# Patient Record
Sex: FEMALE | Race: WHITE | Employment: FULL TIME
[De-identification: names, ages, dates, MRNs, and addresses within clinical notes are randomized per-mention and may not be internally consistent; named-entity substitution may affect disease eponyms.]

---

## 2015-04-01 LAB
HPV ABSTRACT: NORMAL
PAP-ABSTRACT: NORMAL

## 2016-07-14 LAB
HPV ABSTRACT: ABNORMAL
PAP-ABSTRACT: NORMAL

## 2017-03-10 ENCOUNTER — SURGERY (OUTPATIENT)
Age: 62
End: 2017-03-10

## 2017-03-10 ENCOUNTER — HOSPITAL ENCOUNTER (OUTPATIENT)
Facility: CLINIC | Age: 62
Discharge: HOME | End: 2017-03-10
Attending: COLON & RECTAL SURGERY | Admitting: COLON & RECTAL SURGERY
Payer: COMMERCIAL

## 2017-03-10 VITALS
BODY MASS INDEX: 21.35 KG/M2 | OXYGEN SATURATION: 100 % | WEIGHT: 116 LBS | DIASTOLIC BLOOD PRESSURE: 68 MMHG | HEIGHT: 62 IN | SYSTOLIC BLOOD PRESSURE: 99 MMHG | RESPIRATION RATE: 11 BRPM

## 2017-03-10 LAB — COLONOSCOPY: NORMAL

## 2017-03-10 PROCEDURE — 25000128 H RX IP 250 OP 636: Performed by: COLON & RECTAL SURGERY

## 2017-03-10 PROCEDURE — G0500 MOD SEDAT ENDO SERVICE >5YRS: HCPCS | Performed by: COLON & RECTAL SURGERY

## 2017-03-10 PROCEDURE — 25000125 ZZHC RX 250: Performed by: COLON & RECTAL SURGERY

## 2017-03-10 PROCEDURE — 99153 MOD SED SAME PHYS/QHP EA: CPT

## 2017-03-10 PROCEDURE — G0121 COLON CA SCRN NOT HI RSK IND: HCPCS | Performed by: COLON & RECTAL SURGERY

## 2017-03-10 PROCEDURE — 45378 DIAGNOSTIC COLONOSCOPY: CPT | Performed by: COLON & RECTAL SURGERY

## 2017-03-10 RX ORDER — ONDANSETRON 2 MG/ML
4 INJECTION INTRAMUSCULAR; INTRAVENOUS
Status: DISCONTINUED | OUTPATIENT
Start: 2017-03-10 | End: 2017-03-10 | Stop reason: HOSPADM

## 2017-03-10 RX ORDER — FLUMAZENIL 0.1 MG/ML
0.2 INJECTION, SOLUTION INTRAVENOUS
Status: DISCONTINUED | OUTPATIENT
Start: 2017-03-10 | End: 2017-03-10 | Stop reason: HOSPADM

## 2017-03-10 RX ORDER — ONDANSETRON 2 MG/ML
4 INJECTION INTRAMUSCULAR; INTRAVENOUS EVERY 6 HOURS PRN
Status: DISCONTINUED | OUTPATIENT
Start: 2017-03-10 | End: 2017-03-10 | Stop reason: HOSPADM

## 2017-03-10 RX ORDER — NALOXONE HYDROCHLORIDE 0.4 MG/ML
.1-.4 INJECTION, SOLUTION INTRAMUSCULAR; INTRAVENOUS; SUBCUTANEOUS
Status: DISCONTINUED | OUTPATIENT
Start: 2017-03-10 | End: 2017-03-10 | Stop reason: HOSPADM

## 2017-03-10 RX ORDER — LIDOCAINE 40 MG/G
CREAM TOPICAL
Status: DISCONTINUED | OUTPATIENT
Start: 2017-03-10 | End: 2017-03-10 | Stop reason: HOSPADM

## 2017-03-10 RX ORDER — ONDANSETRON 4 MG/1
4 TABLET, ORALLY DISINTEGRATING ORAL EVERY 6 HOURS PRN
Status: DISCONTINUED | OUTPATIENT
Start: 2017-03-10 | End: 2017-03-10 | Stop reason: HOSPADM

## 2017-03-10 RX ORDER — FENTANYL CITRATE 50 UG/ML
INJECTION, SOLUTION INTRAMUSCULAR; INTRAVENOUS PRN
Status: DISCONTINUED | OUTPATIENT
Start: 2017-03-10 | End: 2017-03-10 | Stop reason: HOSPADM

## 2017-03-10 RX ORDER — SODIUM CHLORIDE 9 MG/ML
INJECTION, SOLUTION INTRAVENOUS CONTINUOUS PRN
Status: DISCONTINUED | OUTPATIENT
Start: 2017-03-10 | End: 2017-03-10 | Stop reason: HOSPADM

## 2017-03-10 RX ADMIN — MIDAZOLAM HYDROCHLORIDE 2 MG: 1 INJECTION, SOLUTION INTRAMUSCULAR; INTRAVENOUS at 10:47

## 2017-03-10 RX ADMIN — SODIUM CHLORIDE 500 ML: 9 INJECTION, SOLUTION INTRAVENOUS at 10:54

## 2017-03-10 RX ADMIN — FENTANYL CITRATE 100 MCG: 50 INJECTION, SOLUTION INTRAMUSCULAR; INTRAVENOUS at 10:46

## 2017-03-10 NOTE — H&P
"Pre-Endoscopy History and Physical     Farnaz JAIME [facility] MRN# [id number]   YOB: 1955 Age: 61 year old     Date of Procedure: 3/10/2017  Primary care provider: Riley Martin  Type of Endoscopy: Colonoscopy  Reason for Procedure: hx of polyps  Type of Anesthesia Anticipated: Moderate Sedation    HPI:    Farnaz is a 61 year old female who will be undergoing the above procedure.      A history and physical has been performed. The patient's medications and allergies have been reviewed. The risks and benefits of the procedure and the sedation options and risks were discussed with the patient.  All questions were answered and informed consent was obtained.      She denies a personal or family history of anesthesia complications or bleeding disorders.     No Known Allergies       No current facility-administered medications on file prior to encounter.   Current Outpatient Prescriptions on File Prior to Encounter:  Cholecalciferol (VITAMIN D-3 PO) Take  by mouth.   calcium citrate (CALCITRATE) 950 MG tablet Take 1 tablet by mouth daily.       There is no problem list on file for this patient.       Past Medical History   Diagnosis Date     Colon polyps         Past Surgical History   Procedure Laterality Date      section       x 2     Colonoscopy  3/10/17, 2012     hx polyps       Social History   Substance Use Topics     Smoking status: Never Smoker     Smokeless tobacco: Not on file     Alcohol use Yes      Comment: 1/day       Family History   Problem Relation Age of Onset     Breast Cancer Maternal Grandmother      GASTROINTESTINAL DISEASE Mother      colitis     GASTROINTESTINAL DISEASE Daughter      crohns       REVIEW OF SYSTEMS:     5 point ROS negative except as noted above in HPI, including Gen., Resp., CV, GI &  system review.      PHYSICAL EXAM:   /78  Resp 16  Ht 1.575 m (5' 2\")  Wt 52.6 kg (116 lb)  SpO2 100%  BMI 21.22 kg/m2 Estimated body mass index is 21.22 kg/(m^2) as " "calculated from the following:    Height as of this encounter: 1.575 m (5' 2\").    Weight as of this encounter: 52.6 kg (116 lb).   GENERAL APPEARANCE: healthy and alert  MENTAL STATUS: alert  AIRWAY EXAM: Mallampatti Class I (visualization of the soft palate, fauces, uvula, anterior and posterior pillars)  RESP: lungs clear to auscultation - no rales, rhonchi or wheezes  CV: regular rates and rhythm      IMPRESSION   ASA Class 2 - Mild systemic disease        PLAN:     Plan for colonoscopy. We discussed the risks, benefits and alternatives and the patient wished to proceed.    The above has been forwarded to the consulting provider.      Selma Duval MD  [facility]  Phone: [phone]  Fax: [phone]  March 10, 2017    "

## 2017-07-19 ENCOUNTER — TRANSFERRED RECORDS (OUTPATIENT)
Dept: HEALTH INFORMATION MANAGEMENT | Facility: CLINIC | Age: 62
End: 2017-07-19

## 2017-07-19 LAB
HPV ABSTRACT: ABNORMAL
PAP-ABSTRACT: NORMAL

## 2017-08-10 ENCOUNTER — RESULT FOLLOW UP (OUTPATIENT)
Dept: OBGYN | Facility: CLINIC | Age: 62
End: 2017-08-10

## 2017-08-10 ENCOUNTER — OFFICE VISIT (OUTPATIENT)
Dept: OBGYN | Facility: CLINIC | Age: 62
End: 2017-08-10
Payer: COMMERCIAL

## 2017-08-10 VITALS
BODY MASS INDEX: 22.08 KG/M2 | DIASTOLIC BLOOD PRESSURE: 66 MMHG | HEIGHT: 62 IN | SYSTOLIC BLOOD PRESSURE: 112 MMHG | WEIGHT: 120 LBS

## 2017-08-10 DIAGNOSIS — R87.810 CERVICAL HIGH RISK HPV (HUMAN PAPILLOMAVIRUS) TEST POSITIVE: ICD-10-CM

## 2017-08-10 DIAGNOSIS — R87.612 PAPANICOLAOU SMEAR OF CERVIX WITH LOW GRADE SQUAMOUS INTRAEPITHELIAL LESION (LGSIL): Primary | ICD-10-CM

## 2017-08-10 PROCEDURE — 87624 HPV HI-RISK TYP POOLED RSLT: CPT | Performed by: OBSTETRICS & GYNECOLOGY

## 2017-08-10 PROCEDURE — 57455 BIOPSY OF CERVIX W/SCOPE: CPT | Performed by: OBSTETRICS & GYNECOLOGY

## 2017-08-10 PROCEDURE — 88305 TISSUE EXAM BY PATHOLOGIST: CPT | Performed by: OBSTETRICS & GYNECOLOGY

## 2017-08-10 PROCEDURE — 88142 CYTOPATH C/V THIN LAYER: CPT | Performed by: OBSTETRICS & GYNECOLOGY

## 2017-08-10 RX ORDER — ZOLPIDEM TARTRATE 10 MG/1
2.5 TABLET ORAL
COMMUNITY
Start: 2017-07-28

## 2017-08-10 ASSESSMENT — ANXIETY QUESTIONNAIRES
2. NOT BEING ABLE TO STOP OR CONTROL WORRYING: NOT AT ALL
IF YOU CHECKED OFF ANY PROBLEMS ON THIS QUESTIONNAIRE, HOW DIFFICULT HAVE THESE PROBLEMS MADE IT FOR YOU TO DO YOUR WORK, TAKE CARE OF THINGS AT HOME, OR GET ALONG WITH OTHER PEOPLE: NOT DIFFICULT AT ALL
7. FEELING AFRAID AS IF SOMETHING AWFUL MIGHT HAPPEN: NOT AT ALL
5. BEING SO RESTLESS THAT IT IS HARD TO SIT STILL: NOT AT ALL
3. WORRYING TOO MUCH ABOUT DIFFERENT THINGS: NOT AT ALL
6. BECOMING EASILY ANNOYED OR IRRITABLE: NOT AT ALL
GAD7 TOTAL SCORE: 0
1. FEELING NERVOUS, ANXIOUS, OR ON EDGE: NOT AT ALL

## 2017-08-10 ASSESSMENT — PATIENT HEALTH QUESTIONNAIRE - PHQ9
SUM OF ALL RESPONSES TO PHQ QUESTIONS 1-9: 0
5. POOR APPETITE OR OVEREATING: NOT AT ALL

## 2017-08-10 NOTE — MR AVS SNAPSHOT
"              After Visit Summary   8/10/2017    Farnaz Will    MRN: [id number]           Patient Information     Date Of Birth          1955        Visit Information        Provider Department      8/10/2017 1:30 PM Dennis Quiroz MD; WE COLPOSCOPE [facility] [place]        Today's Diagnoses     Papanicolaou smear of cervix with low grade squamous intraepithelial lesion (LGSIL)    -  1       Follow-ups after your visit        Who to contact     If you have questions or need follow up information about today's clinic visit or your schedule please contact [facility] directly at [phone].  Normal or non-critical lab and imaging results will be communicated to you by MyChart, letter or phone within 4 business days after the clinic has received the results. If you do not hear from us within 7 days, please contact the clinic through MyChart or phone. If you have a critical or abnormal lab result, we will notify you by phone as soon as possible.  Submit refill requests through Parkt or call your pharmacy and they will forward the refill request to us. Please allow 3 business days for your refill to be completed.          Additional Information About Your Visit        MyChart Information     Parkt lets you send messages to your doctor, view your test results, renew your prescriptions, schedule appointments and more. To sign up, go to [email or web address] . Click on \"Log in\" on the left side of the screen, which will take you to the Welcome page. Then click on \"Sign up Now\" on the right side of the page.     You will be asked to enter the access code listed below, as well as some personal information. Please follow the directions to create your username and password.     Your access code is: [email or web address]  Expires: 2017 11:57 AM     Your access code will  in 90 days. If you need help or a new code, please call your [place] clinic or [phone].      " "  Care EveryWhere ID     This is your Care EveryWhere ID. This could be used by other organizations to access your [facility] medical records  [id number]        Your Vitals Were     Height Breastfeeding? BMI (Body Mass Index)             1.575 m (5' 2\") No 21.95 kg/m2          Blood Pressure from Last 3 Encounters:   08/10/17 112/66   03/10/17 99/68   01/30/12 94/62    Weight from Last 3 Encounters:   08/10/17 54.4 kg (120 lb)   03/10/17 52.6 kg (116 lb)   01/30/12 51.7 kg (114 lb)              We Performed the Following     A pap thin layer diagnostic     COLPOSCOPY CERVIX/UPPER VAGINA W BX CERVIX     HPV High Risk Types DNA Cervical     Pap imaged thin layer diagnostic with HPV (select HPV order below)     Surgical pathology exam        Primary Care Provider Office Phone # Fax #    Riley Elle Martin [phone]       [facility] [address]  [facility] [address]        Equal Access to Services     JULIA REED : Hadii aad ku hadasho Soomaali, waaxda luqadaha, qaybta kaalmada adeegyada, waxay giovanniin hayadriane herrera . So [facility] [phone].    ATENCIÓN: Si habla español, tiene a burton disposición servicios gratuitos de asistencia lingüística. [facility] [phone].    We comply with applicable federal civil rights laws and [place] laws. We do not discriminate on the basis of race, color, national origin, age, disability sex, sexual orientation or gender identity.            Thank you!     Thank you for choosing [facility]  for your care. Our goal is always to provide you with excellent care. Hearing back from our patients is one way we can continue to improve our services. Please take a few minutes to complete the written survey that you may receive in the mail after your visit with us. Thank you!             Your Updated Medication List - Protect others around you: Learn how to safely use, store and throw away your medicines at www.disposemymeds.org.          This " list is accurate as of: 8/10/17 11:59 PM.  Always use your most recent med list.                   Brand Name Dispense Instructions for use Diagnosis    calcium citrate 950 MG tablet    CALCITRATE     Take 1 tablet by mouth daily.        VITAMIN D-3 PO      Take  by mouth.        zolpidem 10 MG tablet    AMBIEN     Take 10 mg by mouth

## 2017-08-10 NOTE — PROGRESS NOTES
INDICATIONS:                                                    Is a pregnancy test required: No.  Was a consent obtained?  Yes    Today's PHQ-2 Score: No flowsheet data found.  Today's PHQ-9 Score:    PHQ-9 SCORE 8/10/2017   Total Score 0     Today's GAD7 Score: 0    Farnaz Will, is a 62 year old female, who had a recent  Pap positive for high risk hpv.done:[id number]}.  {YES / . Here today for colposcopy. Discussed indication, risks of infection and bleeding.    Her last pap was No results found for: PAP .    PROCEDURE:                                                      Cervix is stained with acetic acid and viewed colposcopically. Squamocolumnar junction is not visualized in it's entirity. no visible lesions . Biopsy done No. Endocervical curretage Done         POST PROCEDURE:                                                      IMPRESSION await path    PLAN : Await the results of the biopsies. She  tolerated the procedure well. There were no complications. Patient was discharged in stable condition.    Patient advised to call the clinic if excessive bleeding, pelvic pain, or fever.     Follow-up plan based on pathology results.    Dennis Quiroz MD

## 2017-08-10 NOTE — LETTER
August 27, 2019      Farnaz Richard Will  [address]  [place] [address]    Dear ,      At [facility], your health and wellness is our primary concern. That is why we are following up on a colposcopy from 09/10/18. Your provider had recommended that you have a Pap smear and HPV test completed by 09/10/19. Our records do not show that this has been scheduled.    It is important to complete the follow up that your provider has suggested for you to ensure that there are no worsening changes which may, over time, develop into cancer.      Please contact our office at  [phone] to schedule an appointment for a Pap smear and HPV test at your earliest convenience. If you have questions or concerns, please call the clinic and we will be happy to assist you.    If you have completed the tests outside of [facility], please have the results forwarded to our office. We will update the chart for your primary Physician to review before your next annual physical.     Thank you for choosing [facility]!    Sincerely,      Your [facility] Care Team/[facility]

## 2017-08-10 NOTE — LETTER
July 27, 2018      Farnaz Ramos Nicolette  [address] [place] [address]    Dear MsMirtaNicolette,      At [facility], your health and wellness is our primary concern. That is why we are following up on an endocervical curettage, laser vaporization conization of the cervix from 11/09/17. Your provider had recommended that you have a Pap smear and HPV test completed by 08/10/18. Our records do not show that this has been scheduled.    It is important to complete the follow up that your provider has suggested for you to ensure that there are no worsening changes which may, over time, develop into cancer.      Please contact our office at  [phone] to schedule an appointment for a Pap smear and HPV test at your earliest convenience. If you have questions or concerns, please call the clinic and we will be happy to assist you.    If you have completed the tests outside of [facility], please have the results forwarded to our office. We will update the chart for your primary Physician to review before your next annual physical.     Thank you for choosing [facility]!    Sincerely,      Dennis Quiroz MD/[facility]

## 2017-08-11 ASSESSMENT — ANXIETY QUESTIONNAIRES: GAD7 TOTAL SCORE: 0

## 2017-08-14 LAB
COPATH REPORT: NORMAL
COPATH REPORT: NORMAL
PAP: NORMAL

## 2017-08-16 LAB
FINAL DIAGNOSIS: NORMAL
HPV HR 12 DNA CVX QL NAA+PROBE: NEGATIVE
HPV16 DNA SPEC QL NAA+PROBE: NEGATIVE
HPV18 DNA SPEC QL NAA+PROBE: NEGATIVE
SPECIMEN DESCRIPTION: NORMAL

## 2017-08-22 NOTE — PROGRESS NOTES
7/14/16 NIL/+ HR HPV (not 16/18). Plan: cotest in 1 year  7/19/17 unsat pap/+ HR HPV. Plan: colposocpy  8/10/17 Colpo.  No bx.  ECC-nongradable cervical dysplasia. DX NIL/NEg HPV Plan: make appt to discuss with provider  8/30/17   Appt. Plan: cone bx  11/9/17 Conization--negative. ([facility])  11/28/17 Post op visit. Plan: RTC in 2 months. ([facility])  01/23/18 The patient has an excellent outcome from her cervical conization.  She will return for a repeat Pap smear this summer. ([facility])  07/27/18 Pap reminder letter sent. ([facility])  8/16/18 NIL pap, + HR HPV (not 16/18). Plan colp due by 11/16/18 ([facility])  8/23/18  for pt to return call/advised of result and follow up (S)  9/10/18 Colpo-visually normal. Dx NIL pap, Neg HPV.  Plan: cotest in 1 year  08/27/19 Cotest reminder letter sent. ([facility])  09/25/19 TC clinic and schedule. ([facility])  10/10/19: NIL Pap, + HR HPV types 18 and other. Plan [place], due bef 01/10/20. (sk)  10/23/19 left msg (lks) detailed msg (lks)  11/18/19 Colpo: Visually normal. NIL pap, + HR HPV 18. Plan: cotest in 1 year (at annual 2020) ([facility])

## 2017-08-30 ENCOUNTER — OFFICE VISIT (OUTPATIENT)
Dept: OBGYN | Facility: CLINIC | Age: 62
End: 2017-08-30
Payer: COMMERCIAL

## 2017-08-30 VITALS
BODY MASS INDEX: 22.08 KG/M2 | WEIGHT: 120 LBS | DIASTOLIC BLOOD PRESSURE: 64 MMHG | HEART RATE: 68 BPM | SYSTOLIC BLOOD PRESSURE: 102 MMHG | HEIGHT: 62 IN

## 2017-08-30 DIAGNOSIS — D06.9 SEVERE CERVICAL DYSPLASIA: Primary | ICD-10-CM

## 2017-08-30 PROCEDURE — 99214 OFFICE O/P EST MOD 30 MIN: CPT | Performed by: OBSTETRICS & GYNECOLOGY

## 2017-08-30 NOTE — PROGRESS NOTES
SUBJECTIVE:                                                   Farnaz Will is a 62 year old female who presents to clinic today for the following health issue(s):  Patient presents with:  Results: here to discuss colpo results      HPI: The patient was seen at this time and follow-up of an abnormal Pap smear and colposcopy with a negative ectocervix and ungradeable dysplasia of the endocervix. We went through a long discussion of HPV and cervical dysplasia.      No LMP recorded. Patient is postmenopausal..   Patient is sexually active, No obstetric history on file..  Using menopause for contraception.    reports that she has never smoked. She has never used smokeless tobacco.      STD testing offered?  Declined    Health maintenance updated:  yes    Today's PHQ-2 Score: No flowsheet data found.  Today's PHQ-9 Score:   PHQ-9 SCORE 8/10/2017   Total Score 0     Today's RICARDO-7 Score:   RICARDO-7 SCORE 8/10/2017   Total Score 0       Problem list and histories reviewed & adjusted, as indicated.  Additional history: as documented.    Patient Active Problem List   Diagnosis     Cervical high risk HPV (human papillomavirus) test positive     Past Surgical History:   Procedure Laterality Date      SECTION      x 2     COLONOSCOPY  3/10/17,     hx polyps     COLONOSCOPY N/A 3/10/2017    Procedure: COLONOSCOPY;  Surgeon: Selma Duval MD;  Location:  GI      Social History   Substance Use Topics     Smoking status: Never Smoker     Smokeless tobacco: Never Used     Alcohol use Yes      Comment: 1/day      Problem (# of Occurrences) Relation (Name,Age of Onset)    Breast Cancer (1) Maternal Grandmother    GASTROINTESTINAL DISEASE (2) Mother: colitis, Daughter: crohns            Current Outpatient Prescriptions   Medication Sig     zolpidem (AMBIEN) 10 MG tablet Take 10 mg by mouth     Cholecalciferol (VITAMIN D-3 PO) Take  by mouth.     calcium citrate (CALCITRATE) 950 MG tablet Take 1 tablet by mouth  "daily.     No current facility-administered medications for this visit.      No Known Allergies    ROS:  12 point review of systems negative other than symptoms noted below.    OBJECTIVE:     /64  Pulse 68  Ht 5' 2\" (1.575 m)  Wt 120 lb (54.4 kg)  BMI 21.95 kg/m2  Body mass index is 21.95 kg/(m^2).    Exam:  Constitutional:  Appearance: Well nourished, well developed alert, in no acute distress  Neck:  Lymph Nodes:  No lymphadenopathy present; Thyroid:  Gland size normal, nontender, no nodules or masses present on palpation  Chest:  Respiratory Effort:  Breathing unlabored  Cardiovascular: Heart: Auscultation:  Regular rate, normal rhythm, no murmurs present  Gastrointestinal:  Abdominal Examination:  Abdomen nontender to palpation, tone normal without rigidity or guarding, no masses present, umbilicus without lesions; Liver/Spleen:  No hepatomegaly present, liver nontender to palpation; Hernias:  No hernias present  Lymphatic: Lymph Nodes:  No other lymphadenopathy present  Skin:General Inspection:  No rashes present, no lesions present, no areas of discoloration; Genitalia and Groin:  No rashes present, no lesions present, no areas of discoloration, no masses present.  Neurologic/Psychiatric:  Mental Status:  Oriented X3   No Pelvic Exam performed     In-Clinic Test Results:    ASSESSMENT/PLAN:                                                      The patient is cleared for general anesthesia. We anticipate a cone biopsy with endocervical curettage due to an agreeable dysplasia. The risks and complications have been reviewed and accepted.          Dennis Quiroz MD  [facility]  "

## 2017-08-30 NOTE — MR AVS SNAPSHOT
"              After Visit Summary   2017    Farnaz Will    MRN: [id number]           Patient Information     Date Of Birth          1955        Visit Information        Provider Department      2017 11:45 AM Dennis Quiroz MD [facility]        Today's Diagnoses     Severe cervical dysplasia    -  1       Follow-ups after your visit        Who to contact     If you have questions or need follow up information about today's clinic visit or your schedule please contact [facility] directly at [phone].  Normal or non-critical lab and imaging results will be communicated to you by Student Designedhart, letter or phone within 4 business days after the clinic has received the results. If you do not hear from us within 7 days, please contact the clinic through Student Designedhart or phone. If you have a critical or abnormal lab result, we will notify you by phone as soon as possible.  Submit refill requests through Billtrust or call your pharmacy and they will forward the refill request to us. Please allow 3 business days for your refill to be completed.          Additional Information About Your Visit        MyChart Information     Billtrust lets you send messages to your doctor, view your test results, renew your prescriptions, schedule appointments and more. To sign up, go to [email or web address] . Click on \"Log in\" on the left side of the screen, which will take you to the Welcome page. Then click on \"Sign up Now\" on the right side of the page.     You will be asked to enter the access code listed below, as well as some personal information. Please follow the directions to create your username and password.     Your access code is: [email or web address]  Expires: 2017 11:57 AM     Your access code will  in 90 days. If you need help or a new code, please call your [place] clinic or [phone].        Care EveryWhere ID     This is your Care EveryWhere ID. This could be used " "by other organizations to access your [facility] medical records  [id number]        Your Vitals Were     Pulse Height BMI (Body Mass Index)             68 1.575 m (5' 2\") 21.95 kg/m2          Blood Pressure from Last 3 Encounters:   08/30/17 102/64   08/10/17 112/66   03/10/17 99/68    Weight from Last 3 Encounters:   08/30/17 54.4 kg (120 lb)   08/10/17 54.4 kg (120 lb)   03/10/17 52.6 kg (116 lb)              Today, you had the following     No orders found for display       Primary Care Provider Office Phone # Fax #    Riley Elel Martin [phone]       [employer] [facility] [address]  [facility] [address]        Equal Access to Services     ZOE REED : Karyna Milton, waswathi luqadaha, qaybkeyla kaalmaaaron nunn, kylie herrera . So [facility] [phone].    ATENCIÓN: Si habla español, tiene a burton disposición servicios gratuitos de asistencia lingüística. Llame al [phone].    We comply with applicable federal civil rights laws and [place] laws. We do not discriminate on the basis of race, color, national origin, age, disability sex, sexual orientation or gender identity.            Thank you!     Thank you for choosing [facility]  for your care. Our goal is always to provide you with excellent care. Hearing back from our patients is one way we can continue to improve our services. Please take a few minutes to complete the written survey that you may receive in the mail after your visit with us. Thank you!             Your Updated Medication List - Protect others around you: Learn how to safely use, store and throw away your medicines at www.disposemymeds.org.          This list is accurate as of: 8/30/17 12:24 PM.  Always use your most recent med list.                   Brand Name Dispense Instructions for use Diagnosis    calcium citrate 950 MG tablet    CALCITRATE     Take 1 tablet by mouth daily.        VITAMIN D-3 PO      Take  by " mouth.        zolpidem 10 MG tablet    AMBIEN     Take 10 mg by mouth

## 2017-08-31 ENCOUNTER — TELEPHONE (OUTPATIENT)
Dept: OBGYN | Facility: CLINIC | Age: 62
End: 2017-08-31

## 2017-08-31 NOTE — LETTER
November 6, 2017    Farnaz JAIME [facility]                                                                                                                     [address] [place] [address]    Dear Farnaz,    We have made effort to obtain an accurate quote from your insurance company based on the information we have on file. Your actual coverage may differ. [facility] can NOT guarantee coverage. We recommend that if you have questions regarding coverage to call your insurance company. Our billing department is happy to assist you with your insurance claim but you are responsible for all services rendered whether or not they are paid by your insurance provider. Again, this is not a guarantee of benefits just a notice of the information they have given to us.       Insurance Co [employer] Phone # [phone]  ID [id number]         Group [id number]  Mckayla lopes w/ Zee on 11/6/2017    Policy Eff Date 1/1/2017 and current Yes  CoInsurance (covered at) 75% after deductible has been met and 100% after MOOP is met  Deductible $2250 met to date $1435.21  Max Out Of Pocket (MOOP) $3000 met to date $1439.93  CoPay $NA  Prior Auth Required:  No  Pre Existing Condition No  Surgeon TEN Quiroz In Network Yes  [facility] In Network Yes  Referral Needed:  No.  Mailed to Patient Yes                  If No Document why by date                  If Yes                                   Date 11/6/2017      Sincerely,         Mckayla Oneill  Surgery Scheduler

## 2017-08-31 NOTE — TELEPHONE ENCOUNTER
FROM VERBAL AS NO SX REQUEST IN PT CHART    CX CONIZATION   DX CIN3    Patient surgery scheduled on 11/9/2017 at 7:20am Check in 5:30am  Location for surgery to performed:   Surgery Outpatient  Scheduled by Debby 8/31/2017     Information Packet given :Yes: MAILED 8/31/2017    CPT codes given: Yes    80620        Consents signed? N/A  Rep Informed :N/A    PREOP DATE :  10/17/2017  In Epic :Yes    On Spreadsheet :Yes    On Calendar EB  :Yes    In [place] Calendar GELY  :No    Assist NA   Assist in Epic NA  Assist Notified as needed :No     Mckayla Oneill  Surgery Scheduler

## 2017-11-02 ENCOUNTER — OFFICE VISIT (OUTPATIENT)
Dept: OBGYN | Facility: CLINIC | Age: 62
End: 2017-11-02
Payer: COMMERCIAL

## 2017-11-02 VITALS — WEIGHT: 116 LBS | SYSTOLIC BLOOD PRESSURE: 124 MMHG | DIASTOLIC BLOOD PRESSURE: 76 MMHG | BODY MASS INDEX: 21.22 KG/M2

## 2017-11-02 DIAGNOSIS — D06.9 SEVERE CERVICAL DYSPLASIA: ICD-10-CM

## 2017-11-02 DIAGNOSIS — N87.9 CERVICAL DYSPLASIA: Primary | ICD-10-CM

## 2017-11-02 DIAGNOSIS — Z01.812 PRE-OPERATIVE LABORATORY EXAMINATION: Primary | ICD-10-CM

## 2017-11-02 LAB — HGB BLD-MCNC: 13.7 G/DL (ref 11.7–15.7)

## 2017-11-02 PROCEDURE — 36415 COLL VENOUS BLD VENIPUNCTURE: CPT | Performed by: OBSTETRICS & GYNECOLOGY

## 2017-11-02 PROCEDURE — 99213 OFFICE O/P EST LOW 20 MIN: CPT | Performed by: OBSTETRICS & GYNECOLOGY

## 2017-11-02 PROCEDURE — 85018 HEMOGLOBIN: CPT | Performed by: OBSTETRICS & GYNECOLOGY

## 2017-11-02 NOTE — PROGRESS NOTES
SUBJECTIVE:                                                   Farnaz Will is a 62 year old female who presents to clinic today for the following health issue(s):  No chief complaint on file.      HPI: The patient is seen at this time for preoperative clearance for treatment of cervical dysplasia. She had an abnormal Pap smear and at colposcopy had a negative ectocervix. Endocervical curettage show dysplasia that they could not grade. She has had no further bleeding or issues. She is generally healthy having resolved recent upper respiratory infection. She is well aware of the option of observation to see if her body can clear this. Our anticipation is to treat the endocervix with CO2 laser to vaporize away any dysplastic cells and then follow her closely with Pap smears and colposcopy after this.      No LMP recorded. Patient is postmenopausal..   Patient is sexually active,   Using menopause for contraception.    reports that she has never smoked. She has never used smokeless tobacco.      STD testing offered?  Declined    Health maintenance updated:  yes    Today's PHQ-2 Score: No flowsheet data found.  Today's PHQ-9 Score:   PHQ-9 SCORE 8/10/2017   Total Score 0     Today's RICARDO-7 Score:   RICARDO-7 SCORE 8/10/2017   Total Score 0       Problem list and histories reviewed & adjusted, as indicated.  Additional history: as documented.    Patient Active Problem List   Diagnosis     Cervical high risk HPV (human papillomavirus) test positive     Past Surgical History:   Procedure Laterality Date      SECTION      x 2     COLONOSCOPY  3/10/17,     hx polyps     COLONOSCOPY N/A 3/10/2017    Procedure: COLONOSCOPY;  Surgeon: Selma Duval MD;  Location:  GI      Social History   Substance Use Topics     Smoking status: Never Smoker     Smokeless tobacco: Never Used     Alcohol use Yes      Comment: 1/day      Problem (# of Occurrences) Relation (Name,Age of Onset)    Breast Cancer (1)  Maternal Grandmother    GASTROINTESTINAL DISEASE (2) Mother: colitis, Daughter: crohns            Current Outpatient Prescriptions   Medication Sig     zolpidem (AMBIEN) 10 MG tablet Take 10 mg by mouth     Cholecalciferol (VITAMIN D-3 PO) Take  by mouth.     calcium citrate (CALCITRATE) 950 MG tablet Take 1 tablet by mouth daily.     No current facility-administered medications for this visit.      No Known Allergies    ROS:  12 point review of systems negative other than symptoms noted below.    OBJECTIVE:     Breastfeeding? No  There is no height or weight on file to calculate BMI.    Exam:  Constitutional:  Appearance: Well nourished, well developed alert, in no acute distress  Chest:  Respiratory Effort:  Breathing unlabored  Cardiovascular: Heart: Auscultation:  Regular rate, normal rhythm, no murmurs present  Gastrointestinal:  Abdominal Examination:  Abdomen nontender to palpation, tone normal without rigidity or guarding, no masses present, umbilicus without lesions; Liver/Spleen:  No hepatomegaly present, liver nontender to palpation; Hernias:  No hernias present  Lymphatic: Lymph Nodes:  No other lymphadenopathy present  Skin:General Inspection:  No rashes present, no lesions present, no areas of discoloration; Genitalia and Groin:  No rashes present, no lesions present, no areas of discoloration, no masses present.  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 It is my feeling that this is mild but does need to be treated. The option of observation has been presented to the patient. The risks and complications of a vaporization conization with endocervical component was presented to the patient. She accepts the risks.        Dennis Quiroz MD  [facility] [place]

## 2017-11-02 NOTE — MR AVS SNAPSHOT
"              After Visit Summary   11/2/2017    Farnaz Causeyp    MRN: [id number]           Patient Information     Date Of Birth          1955        Visit Information        Provider Department      11/2/2017 4:00 PM Dennis Quiroz MD [facility]        Today's Diagnoses     Cervical dysplasia    -  1       Follow-ups after your visit        Your next 10 appointments already scheduled     Nov 09, 2017   Procedure with Dennis Quiroz MD   [facility] (--)    [address]  [facility] [id number] [phone]              Who to contact     If you have questions or need follow up information about today's clinic visit or your schedule please contact [facility] directly at [phone].  Normal or non-critical lab and imaging results will be communicated to you by MyChart, letter or phone within 4 business days after the clinic has received the results. If you do not hear from us within 7 days, please contact the clinic through MyChart or phone. If you have a critical or abnormal lab result, we will notify you by phone as soon as possible.  Submit refill requests through [employer] or call your pharmacy and they will forward the refill request to us. Please allow 3 business days for your refill to be completed.          Additional Information About Your Visit        MyChart Information     [employer] lets you send messages to your doctor, view your test results, renew your prescriptions, schedule appointments and more. To sign up, go to [email or web address] . Click on \"Log in\" on the left side of the screen, which will take you to the Welcome page. Then click on \"Sign up Now\" on the right side of the page.     You will be asked to enter the access code listed below, as well as some personal information. Please follow the directions to create your username and password.     Your access code is: [email or web address]  Expires: 11/12/2017 11:57 " AM     Your access code will  in 90 days. If you need help or a new code, please call your [place] clinic or [phone].        Care EveryWhere ID     This is your Care EveryWhere ID. This could be used by other organizations to access your [place] medical records  [id number]        Your Vitals Were     Breastfeeding? BMI (Body Mass Index)                No 21.22 kg/m2           Blood Pressure from Last 3 Encounters:   17 124/76   17 102/64   08/10/17 112/66    Weight from Last 3 Encounters:   17 116 lb (52.6 kg)   17 120 lb (54.4 kg)   08/10/17 120 lb (54.4 kg)              Today, you had the following     No orders found for display       Primary Care Provider Office Phone # Fax #    Riley Elle Martin [phone]       [facility] [address]  [facility] [address]        Equal Access to Services     [facility]: Hadii aad ku hadasho Socathy, waaxda luqadaha, qaybta kaalmada adeegyada, kylie herrera . So [facility] [phone].    ATENCIÓN: Si habla español, tiene a burton disposición servicios gratuitos de asistencia lingüística. Llame al [phone].    We comply with applicable federal civil rights laws and [place] laws. We do not discriminate on the basis of race, color, national origin, age, disability, sex, sexual orientation, or gender identity.            Thank you!     Thank you for choosing [facility]  for your care. Our goal is always to provide you with excellent care. Hearing back from our patients is one way we can continue to improve our services. Please take a few minutes to complete the written survey that you may receive in the mail after your visit with us. Thank you!             Your Updated Medication List - Protect others around you: Learn how to safely use, store and throw away your medicines at www.disposemymeds.org.          This list is accurate as of: 17  4:18 PM.  Always use your  most recent med list.                   Brand Name Dispense Instructions for use Diagnosis    calcium citrate 950 MG tablet    CALCITRATE     Take 1 tablet by mouth daily.        VITAMIN D-3 PO      Take  by mouth.        zolpidem 10 MG tablet    AMBIEN     Take 10 mg by mouth

## 2017-11-06 NOTE — TELEPHONE ENCOUNTER
Insurance Co [email or web address] Phone # [phone]  ID [id number]         Group [id number]  Mckayla lopes w/ Zee on 11/6/2017    Policy Eff Date 1/1/2017 and current Yes  CoInsurance (covered at) 75% after deductible has been met and 100% after MOOP is met  Deductible $2250 met to date $1435.21  Max Out Of Pocket (MOOP) $3000 met to date $1439.93  CoPay $NA  Prior Auth Required:  No  Pre Existing Condition No  Surgeon TEN Quiroz In Network Yes  Hospital [facility] In Network Yes  Referral Needed:  No.  Mailed to Patient Yes                  If No Document why by date                  If Yes                                   Date 11/6/2017      Mckayla Oneill  Surgery Scheduler

## 2017-11-06 NOTE — H&P (VIEW-ONLY)
SUBJECTIVE:                                                   Farnaz Will is a 62 year old female who presents to clinic today for the following health issue(s):  No chief complaint on file.      HPI: The patient is seen at this time for preoperative clearance for treatment of cervical dysplasia. She had an abnormal Pap smear and at colposcopy had a negative ectocervix. Endocervical curettage show dysplasia that they could not grade. She has had no further bleeding or issues. She is generally healthy having resolved recent upper respiratory infection. She is well aware of the option of observation to see if her body can clear this. Our anticipation is to treat the endocervix with CO2 laser to vaporize away any dysplastic cells and then follow her closely with Pap smears and colposcopy after this.      No LMP recorded. Patient is postmenopausal..   Patient is sexually active,   Using menopause for contraception.    reports that she has never smoked. She has never used smokeless tobacco.      STD testing offered?  Declined    Health maintenance updated:  yes    Today's PHQ-2 Score: No flowsheet data found.  Today's PHQ-9 Score:   PHQ-9 SCORE 8/10/2017   Total Score 0     Today's RICARDO-7 Score:   RICARDO-7 SCORE 8/10/2017   Total Score 0       Problem list and histories reviewed & adjusted, as indicated.  Additional history: as documented.    Patient Active Problem List   Diagnosis     Cervical high risk HPV (human papillomavirus) test positive     Past Surgical History:   Procedure Laterality Date      SECTION      x 2     COLONOSCOPY  3/10/17,     hx polyps     COLONOSCOPY N/A 3/10/2017    Procedure: COLONOSCOPY;  Surgeon: Selma Duval MD;  Location:  GI      Social History   Substance Use Topics     Smoking status: Never Smoker     Smokeless tobacco: Never Used     Alcohol use Yes      Comment: 1/day      Problem (# of Occurrences) Relation (Name,Age of Onset)    Breast Cancer (1)  Maternal Grandmother    GASTROINTESTINAL DISEASE (2) Mother: colitis, Daughter: crohns            Current Outpatient Prescriptions   Medication Sig     zolpidem (AMBIEN) 10 MG tablet Take 10 mg by mouth     Cholecalciferol (VITAMIN D-3 PO) Take  by mouth.     calcium citrate (CALCITRATE) 950 MG tablet Take 1 tablet by mouth daily.     No current facility-administered medications for this visit.      No Known Allergies    ROS:  12 point review of systems negative other than symptoms noted below.    OBJECTIVE:     Breastfeeding? No  There is no height or weight on file to calculate BMI.    Exam:  Constitutional:  Appearance: Well nourished, well developed alert, in no acute distress  Chest:  Respiratory Effort:  Breathing unlabored  Cardiovascular: Heart: Auscultation:  Regular rate, normal rhythm, no murmurs present  Gastrointestinal:  Abdominal Examination:  Abdomen nontender to palpation, tone normal without rigidity or guarding, no masses present, umbilicus without lesions; Liver/Spleen:  No hepatomegaly present, liver nontender to palpation; Hernias:  No hernias present  Lymphatic: Lymph Nodes:  No other lymphadenopathy present  Skin:General Inspection:  No rashes present, no lesions present, no areas of discoloration; Genitalia and Groin:  No rashes present, no lesions present, no areas of discoloration, no masses present.  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 It is my feeling that this is mild but does need to be treated. The option of observation has been presented to the patient. The risks and complications of a vaporization conization with endocervical component was presented to the patient. She accepts the risks.        Dennis Quiroz MD  [facility] [place]

## 2017-11-07 RX ORDER — MULTIPLE VITAMINS W/ MINERALS TAB 9MG-400MCG
1 TAB ORAL DAILY
COMMUNITY
End: 2019-10-10

## 2017-11-07 RX ORDER — SODIUM PHOSPHATE,MONO-DIBASIC 19G-7G/118
2 ENEMA (ML) RECTAL DAILY
COMMUNITY

## 2017-11-08 ENCOUNTER — ANESTHESIA EVENT (OUTPATIENT)
Dept: SURGERY | Facility: CLINIC | Age: 62
End: 2017-11-08
Payer: COMMERCIAL

## 2017-11-08 RX ORDER — PHENAZOPYRIDINE HYDROCHLORIDE 200 MG/1
200 TABLET, FILM COATED ORAL ONCE
Status: CANCELLED | OUTPATIENT
Start: 2017-11-08 | End: 2017-11-08

## 2017-11-08 NOTE — ANESTHESIA PREPROCEDURE EVALUATION
Anesthesia Evaluation     . Pt has had prior anesthetic. Type: MAC and Regional    No history of anesthetic complications          ROS/MED HX    ENT/Pulmonary:  - neg pulmonary ROS     Neurologic:       Cardiovascular:         METS/Exercise Tolerance:     Hematologic:         Musculoskeletal:         GI/Hepatic:         Renal/Genitourinary:         Endo:         Psychiatric:         Infectious Disease:         Malignancy:         Other:                     Physical Exam  Normal systems: cardiovascular, pulmonary and dental    Airway   Mallampati: I  TM distance: >3 FB  Neck ROM: full    Dental     Cardiovascular   Rhythm and rate: regular and normal      Pulmonary    breath sounds clear to auscultation                    Anesthesia Plan      History & Physical Review  History and physical reviewed and following examination; no interval change.    ASA Status:  1 .    NPO Status:  > 8 hours    Plan for General and LMA with Propofol induction. Maintenance will be TIVA.    PONV prophylaxis:  Ondansetron (or other 5HT-3) and Dexamethasone or Solumedrol       Postoperative Care  Postoperative pain management:  IV analgesics and Oral pain medications.      Consents  Anesthetic plan, risks, benefits and alternatives discussed with:  Patient..                          .

## 2017-11-09 ENCOUNTER — ANESTHESIA (OUTPATIENT)
Dept: SURGERY | Facility: CLINIC | Age: 62
End: 2017-11-09
Payer: COMMERCIAL

## 2017-11-09 ENCOUNTER — HOSPITAL ENCOUNTER (OUTPATIENT)
Facility: CLINIC | Age: 62
Discharge: HOME | End: 2017-11-09
Attending: OBSTETRICS & GYNECOLOGY | Admitting: OBSTETRICS & GYNECOLOGY
Payer: COMMERCIAL

## 2017-11-09 ENCOUNTER — SURGERY (OUTPATIENT)
Age: 62
End: 2017-11-09
Payer: COMMERCIAL

## 2017-11-09 VITALS
DIASTOLIC BLOOD PRESSURE: 62 MMHG | SYSTOLIC BLOOD PRESSURE: 99 MMHG | OXYGEN SATURATION: 98 % | TEMPERATURE: 97 F | BODY MASS INDEX: 21.75 KG/M2 | WEIGHT: 118.2 LBS | RESPIRATION RATE: 18 BRPM | HEIGHT: 62 IN

## 2017-11-09 DIAGNOSIS — R87.810 CERVICAL HIGH RISK HPV (HUMAN PAPILLOMAVIRUS) TEST POSITIVE: Primary | ICD-10-CM

## 2017-11-09 PROCEDURE — 25000128 H RX IP 250 OP 636: Performed by: ANESTHESIOLOGY

## 2017-11-09 PROCEDURE — 25000128 H RX IP 250 OP 636: Performed by: NURSE ANESTHETIST, CERTIFIED REGISTERED

## 2017-11-09 PROCEDURE — 36000056 ZZH SURGERY LEVEL 3 1ST 30 MIN: Performed by: OBSTETRICS & GYNECOLOGY

## 2017-11-09 PROCEDURE — 40000170 ZZH STATISTIC PRE-PROCEDURE ASSESSMENT II: Performed by: OBSTETRICS & GYNECOLOGY

## 2017-11-09 PROCEDURE — 88305 TISSUE EXAM BY PATHOLOGIST: CPT | Mod: 26 | Performed by: OBSTETRICS & GYNECOLOGY

## 2017-11-09 PROCEDURE — 25000125 ZZHC RX 250: Performed by: OBSTETRICS & GYNECOLOGY

## 2017-11-09 PROCEDURE — 71000012 ZZH RECOVERY PHASE 1 LEVEL 1 FIRST HR: Performed by: OBSTETRICS & GYNECOLOGY

## 2017-11-09 PROCEDURE — 37000008 ZZH ANESTHESIA TECHNICAL FEE, 1ST 30 MIN: Performed by: OBSTETRICS & GYNECOLOGY

## 2017-11-09 PROCEDURE — 27210794 ZZH OR GENERAL SUPPLY STERILE: Performed by: OBSTETRICS & GYNECOLOGY

## 2017-11-09 PROCEDURE — 71000027 ZZH RECOVERY PHASE 2 EACH 15 MINS: Performed by: OBSTETRICS & GYNECOLOGY

## 2017-11-09 PROCEDURE — 57520 CONIZATION OF CERVIX: CPT | Performed by: OBSTETRICS & GYNECOLOGY

## 2017-11-09 PROCEDURE — 25000125 ZZHC RX 250: Performed by: NURSE ANESTHETIST, CERTIFIED REGISTERED

## 2017-11-09 PROCEDURE — 88305 TISSUE EXAM BY PATHOLOGIST: CPT | Performed by: OBSTETRICS & GYNECOLOGY

## 2017-11-09 RX ORDER — SACCHAROMYCES BOULARDII 250 MG
250 CAPSULE ORAL DAILY
COMMUNITY
End: 2018-01-23

## 2017-11-09 RX ORDER — HYDROMORPHONE HYDROCHLORIDE 1 MG/ML
.3-.5 INJECTION, SOLUTION INTRAMUSCULAR; INTRAVENOUS; SUBCUTANEOUS EVERY 10 MIN PRN
Status: DISCONTINUED | OUTPATIENT
Start: 2017-11-09 | End: 2017-11-09 | Stop reason: HOSPADM

## 2017-11-09 RX ORDER — DEXAMETHASONE SODIUM PHOSPHATE 4 MG/ML
INJECTION, SOLUTION INTRA-ARTICULAR; INTRALESIONAL; INTRAMUSCULAR; INTRAVENOUS; SOFT TISSUE PRN
Status: DISCONTINUED | OUTPATIENT
Start: 2017-11-09 | End: 2017-11-09

## 2017-11-09 RX ORDER — ONDANSETRON 2 MG/ML
4 INJECTION INTRAMUSCULAR; INTRAVENOUS EVERY 30 MIN PRN
Status: DISCONTINUED | OUTPATIENT
Start: 2017-11-09 | End: 2017-11-09 | Stop reason: HOSPADM

## 2017-11-09 RX ORDER — NALOXONE HYDROCHLORIDE 0.4 MG/ML
.1-.4 INJECTION, SOLUTION INTRAMUSCULAR; INTRAVENOUS; SUBCUTANEOUS
Status: DISCONTINUED | OUTPATIENT
Start: 2017-11-09 | End: 2017-11-09 | Stop reason: HOSPADM

## 2017-11-09 RX ORDER — KETOROLAC TROMETHAMINE 30 MG/ML
30 INJECTION, SOLUTION INTRAMUSCULAR; INTRAVENOUS EVERY 6 HOURS PRN
Status: DISCONTINUED | OUTPATIENT
Start: 2017-11-09 | End: 2017-11-09 | Stop reason: HOSPADM

## 2017-11-09 RX ORDER — SODIUM CHLORIDE, SODIUM LACTATE, POTASSIUM CHLORIDE, CALCIUM CHLORIDE 600; 310; 30; 20 MG/100ML; MG/100ML; MG/100ML; MG/100ML
INJECTION, SOLUTION INTRAVENOUS CONTINUOUS
Status: DISCONTINUED | OUTPATIENT
Start: 2017-11-09 | End: 2017-11-09 | Stop reason: HOSPADM

## 2017-11-09 RX ORDER — EPHEDRINE SULFATE 50 MG/ML
INJECTION, SOLUTION INTRAMUSCULAR; INTRAVENOUS; SUBCUTANEOUS PRN
Status: DISCONTINUED | OUTPATIENT
Start: 2017-11-09 | End: 2017-11-09

## 2017-11-09 RX ORDER — LIDOCAINE HYDROCHLORIDE AND EPINEPHRINE 10; 10 MG/ML; UG/ML
INJECTION, SOLUTION INFILTRATION; PERINEURAL
Status: DISCONTINUED
Start: 2017-11-09 | End: 2017-11-09 | Stop reason: HOSPADM

## 2017-11-09 RX ORDER — LIDOCAINE HYDROCHLORIDE AND EPINEPHRINE 10; 10 MG/ML; UG/ML
INJECTION, SOLUTION INFILTRATION; PERINEURAL PRN
Status: DISCONTINUED | OUTPATIENT
Start: 2017-11-09 | End: 2017-11-09 | Stop reason: HOSPADM

## 2017-11-09 RX ORDER — LIDOCAINE HYDROCHLORIDE 20 MG/ML
INJECTION, SOLUTION INFILTRATION; PERINEURAL PRN
Status: DISCONTINUED | OUTPATIENT
Start: 2017-11-09 | End: 2017-11-09

## 2017-11-09 RX ORDER — ONDANSETRON 2 MG/ML
INJECTION INTRAMUSCULAR; INTRAVENOUS PRN
Status: DISCONTINUED | OUTPATIENT
Start: 2017-11-09 | End: 2017-11-09

## 2017-11-09 RX ORDER — PROPOFOL 10 MG/ML
INJECTION, EMULSION INTRAVENOUS PRN
Status: DISCONTINUED | OUTPATIENT
Start: 2017-11-09 | End: 2017-11-09

## 2017-11-09 RX ORDER — HYDROCODONE BITARTRATE AND ACETAMINOPHEN 5; 325 MG/1; MG/1
1-2 TABLET ORAL EVERY 4 HOURS PRN
Qty: 10 TABLET | Refills: 0 | Status: SHIPPED | OUTPATIENT
Start: 2017-11-09 | End: 2017-11-28

## 2017-11-09 RX ORDER — FENTANYL CITRATE 50 UG/ML
INJECTION, SOLUTION INTRAMUSCULAR; INTRAVENOUS PRN
Status: DISCONTINUED | OUTPATIENT
Start: 2017-11-09 | End: 2017-11-09

## 2017-11-09 RX ORDER — SODIUM CHLORIDE, SODIUM LACTATE, POTASSIUM CHLORIDE, CALCIUM CHLORIDE 600; 310; 30; 20 MG/100ML; MG/100ML; MG/100ML; MG/100ML
INJECTION, SOLUTION INTRAVENOUS CONTINUOUS PRN
Status: DISCONTINUED | OUTPATIENT
Start: 2017-11-09 | End: 2017-11-09

## 2017-11-09 RX ORDER — FENTANYL CITRATE 50 UG/ML
25-50 INJECTION, SOLUTION INTRAMUSCULAR; INTRAVENOUS EVERY 5 MIN PRN
Status: DISCONTINUED | OUTPATIENT
Start: 2017-11-09 | End: 2017-11-09 | Stop reason: HOSPADM

## 2017-11-09 RX ORDER — ONDANSETRON 4 MG/1
4 TABLET, ORALLY DISINTEGRATING ORAL EVERY 30 MIN PRN
Status: DISCONTINUED | OUTPATIENT
Start: 2017-11-09 | End: 2017-11-09 | Stop reason: HOSPADM

## 2017-11-09 RX ORDER — FENTANYL CITRATE 50 UG/ML
25-50 INJECTION, SOLUTION INTRAMUSCULAR; INTRAVENOUS
Status: DISCONTINUED | OUTPATIENT
Start: 2017-11-09 | End: 2017-11-09 | Stop reason: HOSPADM

## 2017-11-09 RX ORDER — HYDROCODONE BITARTRATE AND ACETAMINOPHEN 5; 325 MG/1; MG/1
1-2 TABLET ORAL
Status: DISCONTINUED | OUTPATIENT
Start: 2017-11-09 | End: 2017-11-09 | Stop reason: HOSPADM

## 2017-11-09 RX ORDER — PROPOFOL 10 MG/ML
INJECTION, EMULSION INTRAVENOUS CONTINUOUS PRN
Status: DISCONTINUED | OUTPATIENT
Start: 2017-11-09 | End: 2017-11-09

## 2017-11-09 RX ORDER — MEPERIDINE HYDROCHLORIDE 25 MG/ML
12.5 INJECTION INTRAMUSCULAR; INTRAVENOUS; SUBCUTANEOUS
Status: DISCONTINUED | OUTPATIENT
Start: 2017-11-09 | End: 2017-11-09 | Stop reason: HOSPADM

## 2017-11-09 RX ADMIN — FENTANYL CITRATE 50 MCG: 50 INJECTION, SOLUTION INTRAMUSCULAR; INTRAVENOUS at 07:47

## 2017-11-09 RX ADMIN — ONDANSETRON 4 MG: 2 INJECTION INTRAMUSCULAR; INTRAVENOUS at 08:03

## 2017-11-09 RX ADMIN — LIDOCAINE HYDROCHLORIDE 60 MG: 20 INJECTION, SOLUTION INFILTRATION; PERINEURAL at 07:47

## 2017-11-09 RX ADMIN — PROPOFOL 200 MG: 10 INJECTION, EMULSION INTRAVENOUS at 07:47

## 2017-11-09 RX ADMIN — SODIUM CHLORIDE, POTASSIUM CHLORIDE, SODIUM LACTATE AND CALCIUM CHLORIDE: 600; 310; 30; 20 INJECTION, SOLUTION INTRAVENOUS at 07:45

## 2017-11-09 RX ADMIN — LIDOCAINE HYDROCHLORIDE,EPINEPHRINE BITARTRATE 8 ML: 10; .01 INJECTION, SOLUTION INFILTRATION; PERINEURAL at 08:11

## 2017-11-09 RX ADMIN — Medication 5 MG: at 07:48

## 2017-11-09 RX ADMIN — KETOROLAC TROMETHAMINE 30 MG: 30 INJECTION, SOLUTION INTRAMUSCULAR at 08:18

## 2017-11-09 RX ADMIN — PROPOFOL 40 MG: 10 INJECTION, EMULSION INTRAVENOUS at 07:57

## 2017-11-09 RX ADMIN — PROPOFOL 100 MCG/KG/MIN: 10 INJECTION, EMULSION INTRAVENOUS at 07:47

## 2017-11-09 RX ADMIN — DEXAMETHASONE SODIUM PHOSPHATE 4 MG: 4 INJECTION, SOLUTION INTRA-ARTICULAR; INTRALESIONAL; INTRAMUSCULAR; INTRAVENOUS; SOFT TISSUE at 08:03

## 2017-11-09 RX ADMIN — MIDAZOLAM HYDROCHLORIDE 1 MG: 1 INJECTION, SOLUTION INTRAMUSCULAR; INTRAVENOUS at 07:46

## 2017-11-09 NOTE — IP AVS SNAPSHOT
MRN:[id number]                      After Visit Summary   11/9/2017    Farnaz Causeyp    MRN: [id number]           Thank you!     Thank you for choosing [facility] for your care. Our goal is always to provide you with excellent care. Hearing back from our patients is one way we can continue to improve our services. Please take a few minutes to complete the written survey that you may receive in the mail after you visit with us. Thank you!        Patient Information     Date Of Birth          1955        About your hospital stay     You were admitted on:  November 9, 2017 You last received care in the:  [facility] Day [facility]    You were discharged on:  November 9, 2017       Who to Call     For medical emergencies, please call 911.  For non-urgent questions about your medical care, please call your primary care provider or clinic, [phone]  For questions related to your surgery, please call your surgery clinic        Attending Provider     Provider Dennis Victor MD OB/Gyn       Primary Care Provider Office Phone # Fax #    Riley Elle Martin [phone]      After Care Instructions     Discharge Instructions       Patient to arrange follow up appointment in 2  weeks                  Further instructions from your care team       Same Day Surgery Discharge Instructions for  Sedation and General Anesthesia       It's not unusual to feel dizzy, light-headed or faint for up to 24 hours after surgery or while taking pain medication.  If you have these symptoms: sit for a few minutes before standing and have someone assist you when you get up to walk or use the bathroom.      You should rest and relax for the next 24 hours. We recommend you make arrangements to have an adult stay with you for at least 24 hours after your discharge.  Avoid hazardous and strenuous activity.      DO NOT DRIVE any vehicle or operate mechanical equipment for 24 hours  following the end of your surgery.  Even though you may feel normal, your reactions may be affected by the medication you have received.      Do not drink alcoholic beverages for 24 hours following surgery.       Slowly progress to your regular diet as you feel able. It's not unusual to feel nauseated and/or vomit after receiving anesthesia.  If you develop these symptoms, drink clear liquids (apple juice, ginger ale, broth, 7-up, etc. ) until you feel better.  If your nausea and vomiting persists for 24 hours, please notify your surgeon.        All narcotic pain medications, along with inactivity and anesthesia, can cause constipation. Drinking plenty of liquids and increasing fiber intake will help.      For any questions of a medical nature, call your surgeon.      Do not make important decisions for 24 hours.      If you had general anesthesia, you may have a sore throat for a couple of days related to the breathing tube used during surgery.  You may use Cepacol lozenges to help with this discomfort.  If it worsens or if you develop a fever, contact your surgeon.       If you feel your pain is not well managed with the pain medications prescribed by your surgeon, please contact your surgeon's office to let them know so they can address your concerns.     [facility]  Discharge Instructions  Following D & C / Hysteroscopy    Activity  You may resume normal activities including lifting as needed.  It is permissible to climb stairs. You may drive after 24 hours as long as you are not taking narcotic pain pills.  Baths or showers are perfectly acceptable.      Vaginal Discharge  You may have some vaginal bleeding or discharge for about a week after procedure.  You may use tampons or pads.    Temperature  If you develop temperature elevations to over 101  Fahrenheit, your physician should be called immediately.    Diet  Racine or light diet is advisable the day of surgery.  If nausea persists, continue  "this diet.  If severe, call.    Follow-up  Make an appointment in 1-2 weeks if instructed to at: [phone]        [facility]  [phone]    While you were at the hospital today you received Toradol, an antiinflammatory medication similar to Ibuprofen.  You should not take other antiinflammatory medication, such as Ibuprofen, Motrin, Advil, Aleve, Naprosyn, etc, until 2:15pm.             Pending Results     No orders found from 2017 to 11/10/2017.            Admission Information     Date & Time Provider Department Dept. Phone    2017 Dennis Quiroz MD [facility] Same Day Surgery [phone]      Your Vitals Were     Blood Pressure Temperature Respirations Height Weight Pulse Oximetry    92/68 96.4  F (35.8  C) 12 1.575 m (5' 2\") 53.6 kg (118 lb 3.2 oz) 96%    BMI (Body Mass Index)                   21.62 kg/m2           [employer] Information     [employer] lets you send messages to your doctor, view your test results, renew your prescriptions, schedule appointments and more. To sign up, go to [email or web address] . Click on \"Log in\" on the left side of the screen, which will take you to the Welcome page. Then click on \"Sign up Now\" on the right side of the page.     You will be asked to enter the access code listed below, as well as some personal information. Please follow the directions to create your username and password.     Your access code is: [email or web address]  Expires: 2017 10:57 AM     Your access code will  in 90 days. If you need help or a new code, please call your [place] clinic or [phone].        Care EveryWhere ID     This is your Care EveryWhere ID. This could be used by other organizations to access your [place] medical records  [id number]        Equal Access to Services     ZOE REED : Karyna Milton, marianna sadler, kylie guzmán. [facility] [phone].    ATENCIÓN: Si " daniel la, tiene a burton disposición servicios gratuitos de asistencia lingüística. Heydi boland [phone].    We comply with applicable federal civil rights laws and [place] laws. We do not discriminate on the basis of race, color, national origin, age, disability, sex, sexual orientation, or gender identity.               Review of your medicines      START taking        Dose / Directions    HYDROcodone-acetaminophen 5-325 MG per tablet   Commonly known as:  NORCO   Used for:  Cervical high risk HPV (human papillomavirus) test positive        Dose:  1-2 tablet   Take 1-2 tablets by mouth every 4 hours as needed for other (Moderate to Severe Pain)   Quantity:  10 tablet   Refills:  0         CONTINUE these medicines which have NOT CHANGED        Dose / Directions    calcium citrate 950 MG tablet   Commonly known as:  CALCITRATE        Dose:  1 tablet   Take 1 tablet by mouth daily.   Refills:  0       CLEAR EYES OP        Dose:  1 drop   Place 1 drop into both eyes daily   Refills:  0       FIBER PO        Dose:  2 tablet   Take 2 tablets by mouth daily   Refills:  0       glucosamine-chondroitin 500-400 MG Caps per capsule        Dose:  2 capsule   Take 2 capsules by mouth daily   Refills:  0       Multi-vitamin Tabs tablet        Dose:  1 tablet   Take 1 tablet by mouth daily   Refills:  0       saccharomyces boulardii 250 MG capsule   Commonly known as:  FLORASTOR        Dose:  250 mg   Take 250 mg by mouth daily   Refills:  0       STOOL SOFTENER PO        Dose:  1 capsule   Take 1 capsule by mouth daily   Refills:  0       VITAMIN D-3 PO        Dose:  1 tablet   Take 1 tablet by mouth daily   Refills:  0       zolpidem 10 MG tablet   Commonly known as:  AMBIEN        Dose:  2.5 mg   Take 2.5 mg by mouth nightly as needed   Refills:  0            Where to get your medicines      Some of these will need a paper prescription and others can be bought over the counter. Ask your nurse if you have questions.      Bring a paper prescription for each of these medications     HYDROcodone-acetaminophen 5-325 MG per tablet                Protect others around you: Learn how to safely use, store and throw away your medicines at www.disposemymeds.org.             Medication List: This is a list of all your medications and when to take them. Check marks below indicate your daily home schedule. Keep this list as a reference.      Medications           Morning Afternoon Evening Bedtime As Needed    calcium citrate 950 MG tablet   Commonly known as:  CALCITRATE   Take 1 tablet by mouth daily.                                CLEAR EYES OP   Place 1 drop into both eyes daily                                FIBER PO   Take 2 tablets by mouth daily                                glucosamine-chondroitin 500-400 MG Caps per capsule   Take 2 capsules by mouth daily                                HYDROcodone-acetaminophen 5-325 MG per tablet   Commonly known as:  NORCO   Take 1-2 tablets by mouth every 4 hours as needed for other (Moderate to Severe Pain)                                Multi-vitamin Tabs tablet   Take 1 tablet by mouth daily                                saccharomyces boulardii 250 MG capsule   Commonly known as:  FLORASTOR   Take 250 mg by mouth daily                                STOOL SOFTENER PO   Take 1 capsule by mouth daily                                VITAMIN D-3 PO   Take 1 tablet by mouth daily                                zolpidem 10 MG tablet   Commonly known as:  AMBIEN   Take 2.5 mg by mouth nightly as needed

## 2017-11-09 NOTE — ANESTHESIA POSTPROCEDURE EVALUATION
Patient: Farnaz Will    Procedure(s):  DILATION AND CURETTAGE, LASER VAPORIZATION CERVICAL CONIZATION - Wound Class: II-Clean Contaminated   - Wound Class: II-Clean Contaminated    Diagnosis:TRELL III  Diagnosis Additional Information: No value filed.    Anesthesia Type:  General, LMA    Note:  Anesthesia Post Evaluation    Patient location during evaluation: PACU  Patient participation: Able to fully participate in evaluation  Level of consciousness: awake  Pain management: adequate  Airway patency: patent  Cardiovascular status: acceptable  Respiratory status: acceptable  Hydration status: acceptable  PONV: none     Anesthetic complications: None          Last vitals:  Vitals:    11/09/17 0815 11/09/17 0830 11/09/17 0845   BP: (!) 86/55 92/59 (P) 92/68   Resp: 14 13    Temp: 36.1  C (97  F) 35.5  C (95.9  F)    SpO2: 99% 97%          Electronically Signed By: Juan Lawler MD  November 9, 2017  8:47 AM

## 2017-11-09 NOTE — IP AVS SNAPSHOT
[facility]    [address]    [place] [address]    Phone:  [phone]    Fax:  [phone]                                       After Visit Summary   11/9/2017    Farnaz Will    MRN: [id number]           After Visit Summary Signature Page     I have received my discharge instructions, and my questions have been answered. I have discussed any challenges I see with this plan with the nurse or doctor.    ..........................................................................................................................................  Patient/Patient Representative Signature      ..........................................................................................................................................  Patient Representative Print Name and Relationship to Patient    ..................................................               ................................................  Date                                            Time    ..........................................................................................................................................  Reviewed by Signature/Title    ...................................................              ..............................................  Date                                                            Time

## 2017-11-09 NOTE — ANESTHESIA CARE TRANSFER NOTE
Patient: Farnaz Will    Procedure(s):  DILATION AND CURETTAGE, LASER VAPORIZATION CERVICAL CONIZATION - Wound Class: II-Clean Contaminated   - Wound Class: II-Clean Contaminated    Diagnosis: TRELL III  Diagnosis Additional Information: No value filed.    Anesthesia Type:   General, LMA     Note:  Airway :Face Mask  Patient transferred to:PACU  Comments: 808:  To par awake, dentition unchanged from pre-op.Handoff Report: Identifed the Patient, Identified the Reponsible Provider, Reviewed the pertinent medical history, Discussed the surgical course, Reviewed Intra-OP anesthesia mangement and issues during anesthesia, Set expectations for post-procedure period and Allowed opportunity for questions and acknowledgement of understanding      Vitals: (Last set prior to Anesthesia Care Transfer)    CRNA VITALS  11/9/2017 0738 - 11/9/2017 0808      11/9/2017             Resp Rate (set): 10                Electronically Signed By: CAR Coleman CRNA  November 9, 2017  8:08 AM

## 2017-11-09 NOTE — DISCHARGE INSTRUCTIONS
Same Day Surgery Discharge Instructions for  Sedation and General Anesthesia       It's not unusual to feel dizzy, light-headed or faint for up to 24 hours after surgery or while taking pain medication.  If you have these symptoms: sit for a few minutes before standing and have someone assist you when you get up to walk or use the bathroom.      You should rest and relax for the next 24 hours. We recommend you make arrangements to have an adult stay with you for at least 24 hours after your discharge.  Avoid hazardous and strenuous activity.      DO NOT DRIVE any vehicle or operate mechanical equipment for 24 hours following the end of your surgery.  Even though you may feel normal, your reactions may be affected by the medication you have received.      Do not drink alcoholic beverages for 24 hours following surgery.       Slowly progress to your regular diet as you feel able. It's not unusual to feel nauseated and/or vomit after receiving anesthesia.  If you develop these symptoms, drink clear liquids (apple juice, ginger ale, broth, 7-up, etc. ) until you feel better.  If your nausea and vomiting persists for 24 hours, please notify your surgeon.        All narcotic pain medications, along with inactivity and anesthesia, can cause constipation. Drinking plenty of liquids and increasing fiber intake will help.      For any questions of a medical nature, call your surgeon.      Do not make important decisions for 24 hours.      If you had general anesthesia, you may have a sore throat for a couple of days related to the breathing tube used during surgery.  You may use Cepacol lozenges to help with this discomfort.  If it worsens or if you develop a fever, contact your surgeon.       If you feel your pain is not well managed with the pain medications prescribed by your surgeon, please contact your surgeon's office to let them know so they can address your concerns.     [facility]  Discharge  Instructions  Following D & C / Hysteroscopy    Activity  You may resume normal activities including lifting as needed.  It is permissible to climb stairs. You may drive after 24 hours as long as you are not taking narcotic pain pills.  Baths or showers are perfectly acceptable.      Vaginal Discharge  You may have some vaginal bleeding or discharge for about a week after procedure.  You may use tampons or pads.    Temperature  If you develop temperature elevations to over 101  Fahrenheit, your physician should be called immediately.    Diet  [place] or light diet is advisable the day of surgery.  If nausea persists, continue this diet.  If severe, call.    Follow-up  Make an appointment in 1-2 weeks if instructed to at: [phone]        [facility]  [phone]    While you were at the hospital today you received Toradol, an antiinflammatory medication similar to Ibuprofen.  You should not take other antiinflammatory medication, such as Ibuprofen, Motrin, Advil, Aleve, Naprosyn, etc, until 2:15pm.

## 2017-11-09 NOTE — OP NOTE
DATE OF SURGERY:  2017      SURGEON:  Dennis Laurent MD      REASON FOR ADMISSION:  Endocervical cervical dysplasia.      PREOPERATIVE STATUS:  Farnaz Will is a 62-year-old who was found to have an abnormal Pap smear.  Colposcopy showed a negative ectocervix, but an ungradable dysplasia of the endocervix.  She was brought in at this time for endocervical curettage and laser vaporization conization with extended endocervical component.      OPERATIVE PROCEDURE:  Endocervical curettage, laser vaporization conization of the cervix.      DESCRIPTION OF OPERATIVE PROCEDURE:  After general anesthesia was induced, the patient was placed in the dorsal lithotomy position, and prepped and draped in the usual fashion.  A speculum was placed.  Lugol's staining of the entire vagina and upper vault and cervix was accomplished.  Colposcopy was performed, and this showed no ectocervical or vaginal abnormal epithelium.  An endocervical curettage was performed with only a scant amount of tissue obtained.  The CO2 laser was brought in and connected to the colposcope.  At 25 robles, a laser conization of the ectocervix with extended endocervical component was accomplished.  There was no blood loss.  The patient tolerated this well and went to the recovery room in satisfactory condition.      PLAN:  She will be discharged to home, and given [place] as needed for pain.  She is asked to call for any fever, chills, change in bowel or bladder function.  She should expect a watery discharge from the conization.  She will be seen in the office in 2 weeks for a postoperative check, and followed up with Pap smear in 4-6 months.         DENNIS LAURENT JR, MD             D: 2017 08:20   T: 2017 08:35   MT: EM#[id number]      Name:     FARNAZ WILL   MRN:      [id number]        Account:        [id number]   :      1955           Procedure Date: 2017      Document: [id number]       cc: Riley Martin MD

## 2017-11-10 LAB — COPATH REPORT: NORMAL

## 2017-11-28 ENCOUNTER — OFFICE VISIT (OUTPATIENT)
Dept: OBGYN | Facility: CLINIC | Age: 62
End: 2017-11-28
Payer: COMMERCIAL

## 2017-11-28 VITALS — DIASTOLIC BLOOD PRESSURE: 78 MMHG | SYSTOLIC BLOOD PRESSURE: 108 MMHG | BODY MASS INDEX: 21.03 KG/M2 | WEIGHT: 115 LBS

## 2017-11-28 DIAGNOSIS — Z48.816 AFTERCARE FOLLOWING SURGERY OF THE GENITOURINARY SYSTEM: Primary | ICD-10-CM

## 2017-11-28 DIAGNOSIS — Z09 POSTOP CHECK: Primary | ICD-10-CM

## 2017-11-28 LAB — HGB BLD-MCNC: 13.3 G/DL (ref 11.7–15.7)

## 2017-11-28 PROCEDURE — 85018 HEMOGLOBIN: CPT | Performed by: OBSTETRICS & GYNECOLOGY

## 2017-11-28 PROCEDURE — 36415 COLL VENOUS BLD VENIPUNCTURE: CPT | Performed by: OBSTETRICS & GYNECOLOGY

## 2017-11-28 PROCEDURE — 99024 POSTOP FOLLOW-UP VISIT: CPT | Performed by: OBSTETRICS & GYNECOLOGY

## 2017-11-28 NOTE — PROGRESS NOTES
The patient is seen for her postoperative check. Her endocervical curettage was negative for dysplasia. Her cervix is healing nicely at this point. She is to return in 2 months for a postoperative check.

## 2018-01-23 ENCOUNTER — OFFICE VISIT (OUTPATIENT)
Dept: OBGYN | Facility: CLINIC | Age: 63
End: 2018-01-23
Payer: COMMERCIAL

## 2018-01-23 VITALS
HEIGHT: 62 IN | BODY MASS INDEX: 21.86 KG/M2 | WEIGHT: 118.8 LBS | SYSTOLIC BLOOD PRESSURE: 116 MMHG | DIASTOLIC BLOOD PRESSURE: 62 MMHG

## 2018-01-23 DIAGNOSIS — N87.9 CERVICAL DYSPLASIA: Primary | ICD-10-CM

## 2018-01-23 PROCEDURE — 99024 POSTOP FOLLOW-UP VISIT: CPT | Performed by: OBSTETRICS & GYNECOLOGY

## 2018-01-23 NOTE — PROGRESS NOTES
SUBJECTIVE:                                                   Farnaz Will is a 62 year old female who presents to clinic today for the following health issue(s):  Patient presents with:  Surgical Followup: Endocervical curettage, laser vaporization conization of the cervix.       HPI: The patient is seen in follow-up of a cervical conization for advanced cervical dysplasia.  This was performed in November.  She has no complaints at this time of any bleeding or abnormal discharge.  She has no pain.      No LMP recorded. Patient is postmenopausal..   Patient is sexually active, .  Using menopause for contraception.    reports that she has never smoked. She has never used smokeless tobacco.      STD testing offered?  Declined    Health maintenance updated:  yes    Today's PHQ-2 Score: No flowsheet data found.  Today's PHQ-9 Score:   PHQ-9 SCORE 8/10/2017   Total Score 0     Today's RICARDO-7 Score:   RICARDO-7 SCORE 8/10/2017   Total Score 0       Problem list and histories reviewed & adjusted, as indicated.  Additional history: as documented.    Patient Active Problem List   Diagnosis     Cervical high risk HPV (human papillomavirus) test positive     Past Surgical History:   Procedure Laterality Date      SECTION      x 2     COLONOSCOPY  3/10/17,     hx polyps     COLONOSCOPY N/A 3/10/2017    Procedure: COLONOSCOPY;  Surgeon: Selma Duval MD;  Location:  GI     DILATION AND CURETTAGE, CONIZATION, COMBINED N/A 2017    Procedure: COMBINED DILATION AND CURETTAGE, CONIZATION;;  Surgeon: Dennis Quiroz MD;  Location: [facility]     LASER CO2 CONIZATION N/A 2017    Procedure: LASER CO2 CONIZATION;  DILATION AND CURETTAGE, LASER VAPORIZATION CERVICAL CONIZATION;  Surgeon: Dennis Quiroz MD;  Location: [facility]      Social History   Substance Use Topics     Smoking status: Never Smoker     Smokeless tobacco: Never Used     Alcohol use Yes      Comment: 1/day      Problem (# of  "Occurrences) Relation (Name,Age of Onset)    Breast Cancer (1) Maternal Grandmother    GASTROINTESTINAL DISEASE (2) Mother: colitis, Daughter: crohns            Current Outpatient Prescriptions   Medication Sig     Naphazoline HCl (CLEAR EYES OP) Place 1 drop into both eyes daily     glucosamine-chondroitin 500-400 MG CAPS per capsule Take 2 capsules by mouth daily     Docusate Calcium (STOOL SOFTENER PO) Take 1 capsule by mouth daily     multivitamin, therapeutic with minerals (MULTI-VITAMIN) TABS tablet Take 1 tablet by mouth daily     FIBER PO Take 2 tablets by mouth daily     zolpidem (AMBIEN) 10 MG tablet Take 2.5 mg by mouth nightly as needed      Cholecalciferol (VITAMIN D-3 PO) Take 1 tablet by mouth daily      calcium citrate (CALCITRATE) 950 MG tablet Take 1 tablet by mouth daily.     No current facility-administered medications for this visit.      No Known Allergies    ROS:  12 point review of systems negative other than symptoms noted below.    OBJECTIVE:     /62  Ht 5' 2\" (1.575 m)  Wt 118 lb 12.8 oz (53.9 kg)  BMI 21.73 kg/m2  Body mass index is 21.73 kg/(m^2).    Exam:  Constitutional:  Appearance: Well nourished, well developed alert, in no acute distress  Lymphatic: Lymph Nodes:  No other lymphadenopathy present  Skin:General Inspection:  No rashes present, no lesions present, no areas of discoloration; Genitalia and Groin:  No rashes present, no lesions present, no areas of discoloration, no masses present.  Neurologic/Psychiatric:  Mental Status:  Oriented X3   Pelvic Exam:  External Genitalia:     Normal appearance for age, no discharge present, no tenderness present, no inflammatory lesions present, color normal  Vagina:     Normal vaginal vault without central or paravaginal defects, ATROPHIC  Bladder:     Nontender to palpation  Urethra:   Urethral Body:  Urethra palpation normal, urethra structural support normal   Urethral Meatus:  No erythema or lesions present  Cervix:  "    Appearance healthy, no lesions present, nontender to palpation, no bleeding present  Uterus:     Nontender to palpation, no masses present, position anteflexed, mobility: normal  Adnexa:     No adnexal tenderness present, no adnexal masses present  Perineum:     Perineum within normal limits, no evidence of trauma, no rashes or skin lesions present  Inguinal Lymph Nodes:     No lymphadenopathy present         In-Clinic Test Results:      ASSESSMENT/PLAN:                                                      The patient has an excellent outcome from her cervical conization.  She will return for a repeat Pap smear this summer.  She does her annual examinations at a [facility] physicians.      Dennis Quiroz MD  [facility] [place]

## 2018-01-23 NOTE — MR AVS SNAPSHOT
"              After Visit Summary   2018    Farnaz Will    MRN: [id number]           Patient Information     Date Of Birth          1955        Visit Information        Provider Department      2018 1:00 PM Dennis Quiroz MD [facility]        Today's Diagnoses     Cervical dysplasia    -  1       Follow-ups after your visit        Who to contact     If you have questions or need follow up information about today's clinic visit or your schedule please contact [facility] directly at [phone].  Normal or non-critical lab and imaging results will be communicated to you by interclickhart, letter or phone within 4 business days after the clinic has received the results. If you do not hear from us within 7 days, please contact the clinic through interclickhart or phone. If you have a critical or abnormal lab result, we will notify you by phone as soon as possible.  Submit refill requests through [employer] or call your pharmacy and they will forward the refill request to us. Please allow 3 business days for your refill to be completed.          Additional Information About Your Visit        MyChart Information     StartWire lets you send messages to your doctor, view your test results, renew your prescriptions, schedule appointments and more. To sign up, go to [email or web address] . Click on \"Log in\" on the left side of the screen, which will take you to the Welcome page. Then click on \"Sign up Now\" on the right side of the page.     You will be asked to enter the access code listed below, as well as some personal information. Please follow the directions to create your username and password.     Your access code is: [email or web address]  Expires: 3/4/2018 10:39 AM     Your access code will  in 90 days. If you need help or a new code, please call your [place] clinic or [phone].        Care EveryWhere ID     This is your Care EveryWhere ID. This could be used by other " "organizations to access your [place] medical records  [id number]        Your Vitals Were     Height BMI (Body Mass Index)                5' 2\" (1.575 m) 21.73 kg/m2           Blood Pressure from Last 3 Encounters:   01/23/18 116/62   11/28/17 108/78   11/09/17 99/62    Weight from Last 3 Encounters:   01/23/18 118 lb 12.8 oz (53.9 kg)   11/28/17 115 lb (52.2 kg)   11/09/17 118 lb 3.2 oz (53.6 kg)              Today, you had the following     No orders found for display       Primary Care Provider Office Phone # Fax #    Riley Elle Martin [phone]       [facility] [address]  [facility] [address]        Equal Access to Services     JULIA REED : Karyna Milton, waswathi courtneyqalex, selvin kaallesvia nunn, kylie herrera . So [facility] [phone].    ATENCIÓN: Si habla español, tiene a burton disposición servicios gratuitos de asistencia lingüística. Heydi al [phone].    We comply with applicable federal civil rights laws and [place] laws. We do not discriminate on the basis of race, color, national origin, age, disability, sex, sexual orientation, or gender identity.            Thank you!     Thank you for choosing [facility] [place]  for your care. Our goal is always to provide you with excellent care. Hearing back from our patients is one way we can continue to improve our services. Please take a few minutes to complete the written survey that you may receive in the mail after your visit with us. Thank you!             Your Updated Medication List - Protect others around you: Learn how to safely use, store and throw away your medicines at www.disposemymeds.org.          This list is accurate as of: 1/23/18  1:22 PM.  Always use your most recent med list.                   Brand Name Dispense Instructions for use Diagnosis    calcium citrate 950 MG tablet    CALCITRATE     Take 1 tablet by mouth daily.        CLEAR EYES OP      Place " 1 drop into both eyes daily        FIBER PO      Take 2 tablets by mouth daily        glucosamine-chondroitin 500-400 MG Caps per capsule      Take 2 capsules by mouth daily        Multi-vitamin Tabs tablet      Take 1 tablet by mouth daily        STOOL SOFTENER PO      Take 1 capsule by mouth daily        VITAMIN D-3 PO      Take 1 tablet by mouth daily        zolpidem 10 MG tablet    AMBIEN     Take 2.5 mg by mouth nightly as needed

## 2018-08-16 ENCOUNTER — OFFICE VISIT (OUTPATIENT)
Dept: OBGYN | Facility: CLINIC | Age: 63
End: 2018-08-16
Payer: COMMERCIAL

## 2018-08-16 VITALS
BODY MASS INDEX: 21.71 KG/M2 | WEIGHT: 118 LBS | DIASTOLIC BLOOD PRESSURE: 62 MMHG | HEIGHT: 62 IN | SYSTOLIC BLOOD PRESSURE: 110 MMHG | HEART RATE: 60 BPM

## 2018-08-16 DIAGNOSIS — N87.9 CERVICAL DYSPLASIA: Primary | ICD-10-CM

## 2018-08-16 PROCEDURE — G0145 SCR C/V CYTO,THINLAYER,RESCR: HCPCS | Performed by: OBSTETRICS & GYNECOLOGY

## 2018-08-16 PROCEDURE — 99212 OFFICE O/P EST SF 10 MIN: CPT | Performed by: OBSTETRICS & GYNECOLOGY

## 2018-08-16 PROCEDURE — 87624 HPV HI-RISK TYP POOLED RSLT: CPT | Performed by: OBSTETRICS & GYNECOLOGY

## 2018-08-16 NOTE — MR AVS SNAPSHOT
"              After Visit Summary   2018    Farnaz Will    MRN: [id number]           Patient Information     Date Of Birth          1955        Visit Information        Provider Department      2018 1:15 PM Dennis Quiroz MD [facility]        Today's Diagnoses     Cervical dysplasia    -  1       Follow-ups after your visit        Who to contact     If you have questions or need follow up information about today's clinic visit or your schedule please contact [facility] directly at [phone].  Normal or non-critical lab and imaging results will be communicated to you by Netseerhart, letter or phone within 4 business days after the clinic has received the results. If you do not hear from us within 7 days, please contact the clinic through Netseerhart or phone. If you have a critical or abnormal lab result, we will notify you by phone as soon as possible.  Submit refill requests through [employer] or call your pharmacy and they will forward the refill request to us. Please allow 3 business days for your refill to be completed.          Additional Information About Your Visit        MyChart Information     [employer] lets you send messages to your doctor, view your test results, renew your prescriptions, schedule appointments and more. To sign up, go to [email or web address] . Click on \"Log in\" on the left side of the screen, which will take you to the Welcome page. Then click on \"Sign up Now\" on the right side of the page.     You will be asked to enter the access code listed below, as well as some personal information. Please follow the directions to create your username and password.     Your access code is: [email or web address]  Expires: 2018  1:10 PM     Your access code will  in 90 days. If you need help or a new code, please call your [facility] or [phone].        Care EveryWhere ID     This is your Care EveryWhere ID. This could be used by " "other organizations to access your [place] medical records  [id number]        Your Vitals Were     Pulse Height Breastfeeding? BMI (Body Mass Index)          60 5' 2\" (1.575 m) No 21.58 kg/m2         Blood Pressure from Last 3 Encounters:   08/16/18 110/62   01/23/18 116/62   11/28/17 108/78    Weight from Last 3 Encounters:   08/16/18 118 lb (53.5 kg)   01/23/18 118 lb 12.8 oz (53.9 kg)   11/28/17 115 lb (52.2 kg)              We Performed the Following     HPV High Risk Types DNA Cervical     Pap imaged thin layer screen with HPV - recommended age 30 - 65        Primary Care Provider Office Phone # Fax #    Riley Martin [phone]       [facility] [address]  [facility] [address]        Equal Access to Services     [facility]: Hadii nadeem valadze Socathy, waaxda luqadaha, qaybta kaalmada edourad, kylie herrera . So [facility] [phone].    ATENCIÓN: Si habla español, tiene a burton disposición servicios gratuitos de asistencia lingüística. Claudineame al [phone].    We comply with applicable federal civil rights laws and [place] laws. We do not discriminate on the basis of race, color, national origin, age, disability, sex, sexual orientation, or gender identity.            Thank you!     Thank you for choosing [facility] [place]  for your care. Our goal is always to provide you with excellent care. Hearing back from our patients is one way we can continue to improve our services. Please take a few minutes to complete the written survey that you may receive in the mail after your visit with us. Thank you!             Your Updated Medication List - Protect others around you: Learn how to safely use, store and throw away your medicines at www.disposemymeds.org.          This list is accurate as of 8/16/18  1:44 PM.  Always use your most recent med list.                   Brand Name Dispense Instructions for use Diagnosis    calcium citrate 950 " MG tablet    CALCITRATE     Take 1 tablet by mouth daily.        CLEAR EYES OP      Place 1 drop into both eyes daily        FIBER PO      Take 2 tablets by mouth daily        glucosamine-chondroitin 500-400 MG Caps per capsule      Take 2 capsules by mouth daily        Multi-vitamin Tabs tablet      Take 1 tablet by mouth daily        STOOL SOFTENER PO      Take 1 capsule by mouth daily        VITAMIN D-3 PO      Take 1 tablet by mouth daily        zolpidem 10 MG tablet    AMBIEN     Take 2.5 mg by mouth nightly as needed

## 2018-08-16 NOTE — PROGRESS NOTES
SUBJECTIVE:                                                   Farnaz Will is a 63 year old female who presents to clinic today for the following health issue(s):  Patient presents with:  Gyn Exam: Pap smear follow up      Additional information: Cervical Dysplasia 8/10/17    HPI: The patient is seen at this time for follow-up of treatment of cervical dysplasia last year.  She has no complaints of abnormal bleeding or discharge.  She does her primary care yearly checkup with Dr. Martin      No LMP recorded. Patient is postmenopausal..   Patient is sexually active, .  Using menopause for contraception.    reports that she has never smoked. She has never used smokeless tobacco.    STD testing offered?  Declined    Health maintenance updated:  yes    Today's PHQ-2 Score: No flowsheet data found.  Today's PHQ-9 Score:   PHQ-9 SCORE 8/10/2017   Total Score 0     Today's RICARDO-7 Score:   RICARDO-7 SCORE 8/10/2017   Total Score 0       Problem list and histories reviewed & adjusted, as indicated.  Additional history: as documented.    Patient Active Problem List   Diagnosis     Cervical high risk HPV (human papillomavirus) test positive     Past Surgical History:   Procedure Laterality Date      SECTION      x 2     COLONOSCOPY  3/10/17,     hx polyps     COLONOSCOPY N/A 3/10/2017    Procedure: COLONOSCOPY;  Surgeon: Selma Duval MD;  Location:  GI     DILATION AND CURETTAGE, CONIZATION, COMBINED N/A 2017    Procedure: COMBINED DILATION AND CURETTAGE, CONIZATION;;  Surgeon: Dennis Quiroz MD;  Location: [facility]     LASER CO2 CONIZATION N/A 2017    Procedure: LASER CO2 CONIZATION;  DILATION AND CURETTAGE, LASER VAPORIZATION CERVICAL CONIZATION;  Surgeon: Dennis Quiroz MD;  Location: [facility]      Social History   Substance Use Topics     Smoking status: Never Smoker     Smokeless tobacco: Never Used     Alcohol use Yes      Comment: 1/day      Problem (# of Occurrences) Relation  "(Name,Age of Onset)    Breast Cancer (1) Maternal Grandmother    GASTROINTESTINAL DISEASE (2) Mother: colitis, Daughter: crohns            Current Outpatient Prescriptions   Medication Sig     calcium citrate (CALCITRATE) 950 MG tablet Take 1 tablet by mouth daily.     Cholecalciferol (VITAMIN D-3 PO) Take 1 tablet by mouth daily      Docusate Calcium (STOOL SOFTENER PO) Take 1 capsule by mouth daily     FIBER PO Take 2 tablets by mouth daily     glucosamine-chondroitin 500-400 MG CAPS per capsule Take 2 capsules by mouth daily     multivitamin, therapeutic with minerals (MULTI-VITAMIN) TABS tablet Take 1 tablet by mouth daily     Naphazoline HCl (CLEAR EYES OP) Place 1 drop into both eyes daily     zolpidem (AMBIEN) 10 MG tablet Take 2.5 mg by mouth nightly as needed      No current facility-administered medications for this visit.      No Known Allergies    ROS:  12 point review of systems negative other than symptoms noted below.    OBJECTIVE:     /62  Pulse 60  Ht 5' 2\" (1.575 m)  Wt 118 lb (53.5 kg)  Breastfeeding? No  BMI 21.58 kg/m2  Body mass index is 21.58 kg/(m^2).    Exam:  Constitutional:  Appearance: Well nourished, well developed alert, in no acute distress  Pelvic Exam:  External Genitalia:     Normal appearance for age, no discharge present, no tenderness present, no inflammatory lesions present, color normal  Vagina:     Normal vaginal vault without central or paravaginal defects, no discharge present, no inflammatory lesions present, no masses present  Bladder:     Nontender to palpation  Urethra:   Urethral Body:  Urethra palpation normal, urethra structural support normal   Urethral Meatus:  No erythema or lesions present  Cervix:     Appearance healthy, no lesions present, nontender to palpation, no bleeding present  Uterus:     Uterus: firm, normal sized and nontender, midplane in position.   Adnexa:     No adnexal tenderness present, no adnexal masses present  Perineum:     Perineum " within normal limits, no evidence of trauma, no rashes or skin lesions present  Anus:     Anus within normal limits, no hemorrhoids present  Inguinal Lymph Nodes:     No lymphadenopathy present  Pubic Hair:     Normal pubic hair distribution for age  Genitalia and Groin:     No rashes present, no lesions present, no areas of discoloration, no masses present       In-Clinic Test Results:      ASSESSMENT/PLAN:                                                      Patient with excellent examination.  We will contact her with her Pap results.  When she gets to the point of having 2 or 3 normal Paps in a row we will go to yearly.            Dennis Quiroz MD  [facility]

## 2018-08-21 LAB
COPATH REPORT: NORMAL
PAP: NORMAL

## 2018-08-22 LAB
FINAL DIAGNOSIS: ABNORMAL
HPV HR 12 DNA CVX QL NAA+PROBE: POSITIVE
HPV16 DNA SPEC QL NAA+PROBE: NEGATIVE
HPV18 DNA SPEC QL NAA+PROBE: NEGATIVE
SPECIMEN DESCRIPTION: ABNORMAL
SPECIMEN SOURCE CVX/VAG CYTO: ABNORMAL

## 2018-09-10 ENCOUNTER — OFFICE VISIT (OUTPATIENT)
Dept: OBGYN | Facility: CLINIC | Age: 63
End: 2018-09-10
Payer: COMMERCIAL

## 2018-09-10 VITALS
SYSTOLIC BLOOD PRESSURE: 110 MMHG | WEIGHT: 119 LBS | DIASTOLIC BLOOD PRESSURE: 62 MMHG | HEART RATE: 66 BPM | BODY MASS INDEX: 21.9 KG/M2 | HEIGHT: 62 IN

## 2018-09-10 DIAGNOSIS — R87.810 CERVICAL HIGH RISK HPV (HUMAN PAPILLOMAVIRUS) TEST POSITIVE: Primary | ICD-10-CM

## 2018-09-10 PROCEDURE — 57452 EXAM OF CERVIX W/SCOPE: CPT | Performed by: OBSTETRICS & GYNECOLOGY

## 2018-09-10 PROCEDURE — 88175 CYTOPATH C/V AUTO FLUID REDO: CPT | Performed by: OBSTETRICS & GYNECOLOGY

## 2018-09-10 PROCEDURE — 87624 HPV HI-RISK TYP POOLED RSLT: CPT | Performed by: OBSTETRICS & GYNECOLOGY

## 2018-09-10 NOTE — MR AVS SNAPSHOT
"              After Visit Summary   9/10/2018    Farnaz Causeyp    MRN: [id number]           Patient Information     Date Of Birth          1955        Visit Information        Provider Department      9/10/2018 2:15 PM Dennis Quiroz MD; WE COLPOSCOPE 2 [facility]        Today's Diagnoses     Cervical high risk HPV (human papillomavirus) test positive    -  1       Follow-ups after your visit        Who to contact     If you have questions or need follow up information about today's clinic visit or your schedule please contact [facility] directly at [phone].  Normal or non-critical lab and imaging results will be communicated to you by MyChart, letter or phone within 4 business days after the clinic has received the results. If you do not hear from us within 7 days, please contact the clinic through [employer] or phone. If you have a critical or abnormal lab result, we will notify you by phone as soon as possible.  Submit refill requests through [employer] or call your pharmacy and they will forward the refill request to us. Please allow 3 business days for your refill to be completed.          Additional Information About Your Visit        MyChart Information     [employer] lets you send messages to your doctor, view your test results, renew your prescriptions, schedule appointments and more. To sign up, go to [email or web address] . Click on \"Log in\" on the left side of the screen, which will take you to the Welcome page. Then click on \"Sign up Now\" on the right side of the page.     You will be asked to enter the access code listed below, as well as some personal information. Please follow the directions to create your username and password.     Your access code is: [email or web address]  Expires: 2018  1:10 PM     Your access code will  in 90 days. If you need help or a new code, please call your [facility] or [phone].        Care EveryWhere ID     " "This is your Care EveryWhere ID. This could be used by other organizations to access your [place] medical records  [id number]        Your Vitals Were     Pulse Height BMI (Body Mass Index)             66 5' 2\" (1.575 m) 21.77 kg/m2          Blood Pressure from Last 3 Encounters:   09/10/18 110/62   08/16/18 110/62   01/23/18 116/62    Weight from Last 3 Encounters:   09/10/18 119 lb (54 kg)   08/16/18 118 lb (53.5 kg)   01/23/18 118 lb 12.8 oz (53.9 kg)              We Performed the Following     COLPOSCOPY CERVIX/UPPER VAGINA W BX CERVIX     HPV High Risk Types DNA Cervical     PAP imaged thin layer, diagnostic        Primary Care Provider Office Phone # Fax #    Riley Elle Martin [phone]       [facility] [address]  [facility] [address]        Equal Access to Services     [facility] campus[facility]ENN : Hadii nadeem lazcanoo Socathy, waaxda luqadaha, qaybta kaalmada adewarnerda, kylie herrera . So [facility] [phone].    ATENCIÓN: Si habla español, tiene a burton disposición servicios gratuitos de asistencia lingüística. Llame al [phone].    We comply with applicable federal civil rights laws and [place] laws. We do not discriminate on the basis of race, color, national origin, age, disability, sex, sexual orientation, or gender identity.            Thank you!     Thank you for choosing [facility]  for your care. Our goal is always to provide you with excellent care. Hearing back from our patients is one way we can continue to improve our services. Please take a few minutes to complete the written survey that you may receive in the mail after your visit with us. Thank you!             Your Updated Medication List - Protect others around you: Learn how to safely use, store and throw away your medicines at www.disposemymeds.org.          This list is accurate as of 9/10/18  2:29 PM.  Always use your most recent med list.                   Brand Name " Dispense Instructions for use Diagnosis    calcium citrate 950 MG tablet    CALCITRATE     Take 1 tablet by mouth daily.        CLEAR EYES OP      Place 1 drop into both eyes daily        FIBER PO      Take 2 tablets by mouth daily        glucosamine-chondroitin 500-400 MG Caps per capsule      Take 2 capsules by mouth daily        Multi-vitamin Tabs tablet      Take 1 tablet by mouth daily        STOOL SOFTENER PO      Take 1 capsule by mouth daily        VITAMIN D-3 PO      Take 1 tablet by mouth daily        zolpidem 10 MG tablet    AMBIEN     Take 2.5 mg by mouth nightly as needed

## 2018-09-10 NOTE — PROGRESS NOTES
INDICATIONS:                                                    Is a pregnancy test required: No.  Was a consent obtained?  Yes      Farnaz Will, is a 63 year old female, who had a recent neg pap.  HPV positive.  Yes prior history of abnormal pap. Here today for colposcopy. Discussed indication, risks of infection and bleeding.    Her last pap was   Lab Results   Component Value Date    PAP NIL, HPV+ 08/16/2018    .    PROCEDURE:                                                      Cervix is stained with acetic acid and viewed colposcopically. Squamocolumnar junction is not visualized in it's entirety. no visible lesions . Biopsy done No. Endocervical curretage Not Done         POST PROCEDURE:                                                      IMPRESSION: High risk HPV infection with no visual signs of disease     PLAN :.  Repeat pap in 12 months.  She  tolerated the procedure well. There were no complications. Patient was discharged in stable condition.    Patient advised to call the clinic if excessive bleeding, pelvic pain, or fever.     Follow-up plan based on pathology results.    Dennis Quiroz MD

## 2018-09-10 NOTE — LETTER
September 17, 2018    Farnaz Ramos [facility]  [address] [place] [address]    Dear Farnaz,  We are happy to inform you that your PAP smear result was normal taken during your normal colposcopy on 9/10/18.  We are now able to do a follow up test on PAP smears. The DNA test is for HPV (Human Papilloma Virus). Cervical cancer is closely linked with certain types of HPV. Your results showed no evidence of high risk HPV.  Therefore we recommend you return in 1 year for your next pap smear and HPV test.  You will still need to return to the clinic every year for an annual exam and other preventive tests.  Please contact the clinic at [phone] with any questions.  Sincerely,    Dennis Quiroz MD/Monet Tena RN, BSN  Pap Tracking

## 2018-09-12 LAB
COPATH REPORT: NORMAL
PAP: NORMAL

## 2018-09-13 LAB
FINAL DIAGNOSIS: NORMAL
HPV HR 12 DNA CVX QL NAA+PROBE: NEGATIVE
HPV16 DNA SPEC QL NAA+PROBE: NEGATIVE
HPV18 DNA SPEC QL NAA+PROBE: NEGATIVE
SPECIMEN DESCRIPTION: NORMAL
SPECIMEN SOURCE CVX/VAG CYTO: NORMAL

## 2018-10-02 ENCOUNTER — TRANSFERRED RECORDS (OUTPATIENT)
Dept: HEALTH INFORMATION MANAGEMENT | Facility: CLINIC | Age: 63
End: 2018-10-02

## 2019-03-08 ENCOUNTER — TELEPHONE (OUTPATIENT)
Dept: OBGYN | Facility: CLINIC | Age: 64
End: 2019-03-08

## 2019-03-08 NOTE — TELEPHONE ENCOUNTER
Please abstract the following data from this visit with this patient into the appropriate field in Epic: Care Everywhere    Mammogram done on this date: 10/02/2018 (approximately), by this group: [facility], results were Normal.

## 2019-09-25 ENCOUNTER — TELEPHONE (OUTPATIENT)
Dept: OBGYN | Facility: CLINIC | Age: 64
End: 2019-09-25

## 2019-09-25 NOTE — TELEPHONE ENCOUNTER
Pt is past due for f/u pap smear.  [facility] clinic and schedule.    Arleen Silverman  Pap Tracking

## 2019-10-09 NOTE — PROGRESS NOTES
SUBJECTIVE:                                                   Farnaz Will is a 64 year old female who presents to clinic today for the following health issue(s):  Patient presents with:  Gyn Exam: Pap      HPI: The patient is seen at this time for repeat Pap smear.  Her last smear was negative but she has high risk positive.      No LMP recorded. Patient is postmenopausal..     Patient is sexually active, .  Using menopause for contraception.    reports that she has never smoked. She has never used smokeless tobacco.    STD testing offered?  Declined    Health maintenance updated:  yes    Today's PHQ-2 Score: No flowsheet data found.  Today's PHQ-9 Score:   PHQ-9 SCORE 8/10/2017   PHQ-9 Total Score 0     Today's RICARDO-7 Score:   RICARDO-7 SCORE 8/10/2017   Total Score 0       Problem list and histories reviewed & adjusted, as indicated.  Additional history: as documented.    Patient Active Problem List   Diagnosis     Cervical high risk HPV (human papillomavirus) test positive     Past Surgical History:   Procedure Laterality Date      SECTION      x 2     COLONOSCOPY  3/10/17,     hx polyps     COLONOSCOPY N/A 3/10/2017    Procedure: COLONOSCOPY;  Surgeon: Selma Duval MD;  Location:  GI     DILATION AND CURETTAGE, CONIZATION, COMBINED N/A 2017    Procedure: COMBINED DILATION AND CURETTAGE, CONIZATION;;  Surgeon: Dennis Quiroz MD;  Location: [facility]     LASER CO2 CONIZATION N/A 2017    Procedure: LASER CO2 CONIZATION;  DILATION AND CURETTAGE, LASER VAPORIZATION CERVICAL CONIZATION;  Surgeon: Dennis Quiroz MD;  Location: [facility]      Social History     Tobacco Use     Smoking status: Never Smoker     Smokeless tobacco: Never Used   Substance Use Topics     Alcohol use: Yes     Comment: 1/day      Problem (# of Occurrences) Relation (Name,Age of Onset)    Breast Cancer (1) Maternal Grandmother    Gastrointestinal Disease (2) Mother: colitis, Daughter: crohns         "    Current Outpatient Medications   Medication Sig     calcium citrate (CALCITRATE) 950 MG tablet Take 1 tablet by mouth daily.     Cholecalciferol (VITAMIN D-3 PO) Take 1 tablet by mouth daily      Docusate Calcium (STOOL SOFTENER PO) Take 1 capsule by mouth daily     FIBER PO Take 2 tablets by mouth daily     glucosamine-chondroitin 500-400 MG CAPS per capsule Take 2 capsules by mouth daily     Naphazoline HCl (CLEAR EYES OP) Place 1 drop into both eyes daily     zolpidem (AMBIEN) 10 MG tablet Take 2.5 mg by mouth nightly as needed      No current facility-administered medications for this visit.      No Known Allergies    ROS:  12 point review of systems negative other than symptoms noted below.    OBJECTIVE:     /72   Ht 1.575 m (5' 2\")   Wt 53.4 kg (117 lb 12.8 oz)   Breastfeeding? No   BMI 21.55 kg/m    Body mass index is 21.55 kg/m .    Exam:  Constitutional:  Appearance: Well nourished, well developed alert, in no acute distress  Lymphatic: Lymph Nodes:  No other lymphadenopathy present  Skin: General Inspection:  No rashes present, no lesions present, no areas of discoloration.  Neurologic:  Mental Status:  Oriented X3.  Normal strength and tone, sensory exam grossly normal, mentation intact and speech normal.    Psychiatric:  Mentation appears normal and affect normal/bright.  Pelvic Exam:  External Genitalia:     Normal appearance for age, no discharge present, no tenderness present, no inflammatory lesions present, color normal  Vagina:     Normal vaginal vault without central or paravaginal defects, ATROPHIC  Bladder:     Nontender to palpation  Urethra:   Urethral Body:  Urethra palpation normal, urethra structural support normal   Urethral Meatus:  No erythema or lesions present  Cervix:     Appearance healthy, no lesions present, nontender to palpation, no bleeding present  Uterus:     Nontender to palpation, no masses present, position anteflexed, mobility: normal  Adnexa:     No adnexal " tenderness present, no adnexal masses present  Perineum:     Perineum within normal limits, no evidence of trauma, no rashes or skin lesions present  Inguinal Lymph Nodes:     No lymphadenopathy present       In-Clinic Test Results:      ASSESSMENT/PLAN:                                                        ICD-10-CM    1. Screening for cervical cancer Z12.4 Pap imaged thin layer screen with HPV - recommended age 30 - 65     HPV High Risk Types DNA Cervical       We will contact the patient with her repeat Pap smear and see her at her next yearly.        Dennis Quiroz MD  [facility] [place]

## 2019-10-10 ENCOUNTER — OFFICE VISIT (OUTPATIENT)
Dept: OBGYN | Facility: CLINIC | Age: 64
End: 2019-10-10
Payer: COMMERCIAL

## 2019-10-10 VITALS
WEIGHT: 117.8 LBS | SYSTOLIC BLOOD PRESSURE: 104 MMHG | BODY MASS INDEX: 21.68 KG/M2 | HEIGHT: 62 IN | DIASTOLIC BLOOD PRESSURE: 72 MMHG

## 2019-10-10 DIAGNOSIS — Z12.4 SCREENING FOR CERVICAL CANCER: Primary | ICD-10-CM

## 2019-10-10 PROCEDURE — 88175 CYTOPATH C/V AUTO FLUID REDO: CPT | Performed by: OBSTETRICS & GYNECOLOGY

## 2019-10-10 PROCEDURE — 87624 HPV HI-RISK TYP POOLED RSLT: CPT | Performed by: OBSTETRICS & GYNECOLOGY

## 2019-10-10 PROCEDURE — 99212 OFFICE O/P EST SF 10 MIN: CPT | Performed by: OBSTETRICS & GYNECOLOGY

## 2019-10-10 ASSESSMENT — MIFFLIN-ST. JEOR: SCORE: 1027.59

## 2019-10-17 LAB
COPATH REPORT: NORMAL
PAP: NORMAL

## 2019-10-18 LAB
FINAL DIAGNOSIS: ABNORMAL
HPV HR 12 DNA CVX QL NAA+PROBE: POSITIVE
HPV16 DNA SPEC QL NAA+PROBE: NEGATIVE
HPV18 DNA SPEC QL NAA+PROBE: POSITIVE
SPECIMEN DESCRIPTION: ABNORMAL
SPECIMEN SOURCE CVX/VAG CYTO: ABNORMAL

## 2019-11-14 NOTE — PROGRESS NOTES
INDICATIONS:                                                    Is a pregnancy test required: No.  Was a consent obtained?  Yes    Today's PHQ-2 Score:   PHQ-2 ( 1999 [employer]) 11/18/2019   Q1: Little interest or pleasure in doing things 0   Q2: Feeling down, depressed or hopeless 0   PHQ-2 Score 0     Today's PHQ-9 Score:    PHQ-9 SCORE 8/10/2017   PHQ-9 Total Score 0       Farnaz Will, is a 64 year old female, who had a recent NIL pap.  HPV 18 and other positive Yes prior history of abnormal pap. Here today for colposcopy. Discussed indication, risks of infection and bleeding.    Her last pap was   Lab Results   Component Value Date    PAP NIL 10/10/2019    .    PROCEDURE:                                                      Cervix is stained with acetic acid and viewed colposcopically. Squamocolumnar junction is visualized in it's entirety. no visible lesions . Biopsy done No. Endocervical curretage Not Done         POST PROCEDURE:                                                      IMPRESSION: High risk HPV infection with previous laser vaporization conization in 2017 for dysplasia.  Scarring of post cone cervix    PLAN : Await the results of the biopsies.  She tolerated the procedure well. There were no complications. Patient was discharged in stable condition.    Patient advised to call the clinic if excessive bleeding, pelvic pain, or fever.     Follow-up re-Pap at annual in 2020.  Dennis Quiroz MD

## 2019-11-18 ENCOUNTER — OFFICE VISIT (OUTPATIENT)
Dept: OBGYN | Facility: CLINIC | Age: 64
End: 2019-11-18
Payer: COMMERCIAL

## 2019-11-18 VITALS
HEIGHT: 62 IN | HEART RATE: 68 BPM | SYSTOLIC BLOOD PRESSURE: 108 MMHG | BODY MASS INDEX: 21.35 KG/M2 | WEIGHT: 116 LBS | DIASTOLIC BLOOD PRESSURE: 72 MMHG

## 2019-11-18 DIAGNOSIS — R87.810 CERVICAL HIGH RISK HPV (HUMAN PAPILLOMAVIRUS) TEST POSITIVE: Primary | ICD-10-CM

## 2019-11-18 PROCEDURE — 57452 EXAM OF CERVIX W/SCOPE: CPT | Performed by: OBSTETRICS & GYNECOLOGY

## 2019-11-18 PROCEDURE — 87624 HPV HI-RISK TYP POOLED RSLT: CPT | Performed by: OBSTETRICS & GYNECOLOGY

## 2019-11-18 PROCEDURE — 88175 CYTOPATH C/V AUTO FLUID REDO: CPT | Performed by: OBSTETRICS & GYNECOLOGY

## 2019-11-18 ASSESSMENT — MIFFLIN-ST. JEOR: SCORE: 1019.42

## 2019-11-18 NOTE — LETTER
November 25, 2019      Farnaz Ramos [facility]  [address] [place] [address]    Dear ,      This letter is in regards to the PAP smear and HPV (Human Papillomavirus) test you had done during your visually normal colposcopy. Your PAP test result is normal, but your HPV (Human Papillomavirus) test was positive for HPV 18.     About 80 percent of women have been exposed to HPV virus throughout their lifetime. There is no medication for the treatment of HPV. Typically your own immune system gets rid of the virus before it does harm. HPV is spread by direct skin-to-skin contact, including sexual intercourse, oral sex, anal sex, or any other contact involving the genital area (example: hand to genital contact). It is not possible to become infected with HPV by touching an object, such as a toilet seat. Most people who are infected with HPV have no signs or symptoms.    Things that you can do to boost your immune system and help your body get rid of HPV: get plenty of rest, eat a well-balanced diet of healthy foods, stop smoking, exercise regularly and decrease stress.    Your pap smear and HPV test should be repeated at your next annual exam.     If you have additional questions regarding this result, please call our registered nurse, Monet at [phone].    Sincerely,    Monet SCHAFFER on behalf of  Dennis Quiroz MD

## 2019-11-21 LAB
COPATH REPORT: NORMAL
PAP: NORMAL

## 2019-11-22 LAB
FINAL DIAGNOSIS: ABNORMAL
HPV HR 12 DNA CVX QL NAA+PROBE: NEGATIVE
HPV16 DNA SPEC QL NAA+PROBE: NEGATIVE
HPV18 DNA SPEC QL NAA+PROBE: POSITIVE
SPECIMEN DESCRIPTION: ABNORMAL
SPECIMEN SOURCE CVX/VAG CYTO: ABNORMAL

## 2020-11-04 ENCOUNTER — PATIENT OUTREACH (OUTPATIENT)
Dept: OBGYN | Facility: CLINIC | Age: 65
End: 2020-11-04

## 2020-11-04 DIAGNOSIS — R87.810 CERVICAL HIGH RISK HPV (HUMAN PAPILLOMAVIRUS) TEST POSITIVE: ICD-10-CM

## 2020-11-04 NOTE — LETTER
November 4, 2020      Farnaz Will  [address]  [place] [address]        Dear ,    At [facility], your health and wellness are our primary concern. That is why we are following up on your most recent Colposcopy.    Please call [phone] to schedule an appointment for your recommended follow-up Pap smear and Human Papillomavirus (HPV) test at your earliest convenience.      If you have completed the appointment outside of the [facility] system, please have the records forwarded to our office. We will update your chart for your provider to review before your next annual wellness visit.     Thank you for choosing [facility]!      Sincerely,    Your [facility] Care Team

## 2020-12-03 ENCOUNTER — PATIENT OUTREACH (OUTPATIENT)
Dept: OBGYN | Facility: CLINIC | Age: 65
End: 2020-12-03

## 2020-12-03 DIAGNOSIS — R87.810 CERVICAL HIGH RISK HPV (HUMAN PAPILLOMAVIRUS) TEST POSITIVE: ICD-10-CM

## 2020-12-03 NOTE — TELEPHONE ENCOUNTER
Reminder call placed - spoke to patient who stated she will be calling to schedule    Alba Mckinnon-   [facility] Pap Tracking

## 2021-01-19 ENCOUNTER — OFFICE VISIT (OUTPATIENT)
Dept: OBGYN | Facility: CLINIC | Age: 66
End: 2021-01-19
Payer: COMMERCIAL

## 2021-01-19 VITALS
BODY MASS INDEX: 21.75 KG/M2 | DIASTOLIC BLOOD PRESSURE: 88 MMHG | HEIGHT: 62 IN | WEIGHT: 118.2 LBS | SYSTOLIC BLOOD PRESSURE: 136 MMHG

## 2021-01-19 DIAGNOSIS — R87.810 CERVICAL HIGH RISK HPV (HUMAN PAPILLOMAVIRUS) TEST POSITIVE: Primary | ICD-10-CM

## 2021-01-19 PROCEDURE — 99212 OFFICE O/P EST SF 10 MIN: CPT | Performed by: OBSTETRICS & GYNECOLOGY

## 2021-01-19 PROCEDURE — 87624 HPV HI-RISK TYP POOLED RSLT: CPT | Performed by: OBSTETRICS & GYNECOLOGY

## 2021-01-19 PROCEDURE — 88175 CYTOPATH C/V AUTO FLUID REDO: CPT | Performed by: OBSTETRICS & GYNECOLOGY

## 2021-01-19 RX ORDER — MULTIVIT WITH MINERALS/LUTEIN
1000 TABLET ORAL DAILY
COMMUNITY

## 2021-01-19 ASSESSMENT — MIFFLIN-ST. JEOR: SCORE: 1024.4

## 2021-01-21 LAB
COPATH REPORT: NORMAL
PAP: NORMAL

## 2021-01-26 ENCOUNTER — PATIENT OUTREACH (OUTPATIENT)
Dept: OBGYN | Facility: CLINIC | Age: 66
End: 2021-01-26

## 2021-01-26 DIAGNOSIS — R87.810 CERVICAL HIGH RISK HPV (HUMAN PAPILLOMAVIRUS) TEST POSITIVE: ICD-10-CM

## 2022-01-04 ENCOUNTER — PATIENT OUTREACH (OUTPATIENT)
Dept: OBGYN | Facility: CLINIC | Age: 67
End: 2022-01-04
Payer: COMMERCIAL

## 2022-01-04 DIAGNOSIS — R87.810 CERVICAL HIGH RISK HPV (HUMAN PAPILLOMAVIRUS) TEST POSITIVE: ICD-10-CM

## 2022-01-04 NOTE — LETTER
January 4, 2022      Farnaz Will  [address]  [place] [address]        Dear ,    This letter is to remind you that you are due for your follow-up Pap smear and Human Papillomavirus (HPV) test.    Please call [phone] to schedule your appointment at your earliest convenience.    If you have completed the appointment outside of the [facility] system, please have the records forwarded to our office. We will update your chart for your provider to review before your next annual wellness visit.     Thank you for choosing [facility]!      Sincerely,    Your [facility] Care Team

## 2022-01-10 NOTE — PROGRESS NOTES
SUBJECTIVE:                                                   Farnaz Will is a 65 year old female who presents to clinic today for the following health issue(s):  Patient presents with:  Repeat Pap Smear: Last pap was 2019- NIL, HPV 18+        HPI: The patient is a 65-year-old who is here for repeat Pap smear.  Her last Pap in November was normal but HPV 18+.  She has requested ongoing screening.      No LMP recorded. Patient is postmenopausal..   Patient is sexually active, .   reports that she has never smoked. She has never used smokeless tobacco.  Health maintenance updated:  Needs mammogram    Today's PHQ-2 Score:   PHQ-2 (  [employer]) 2019   Q1: Little interest or pleasure in doing things 0   Q2: Feeling down, depressed or hopeless 0   PHQ-2 Score 0     Today's PHQ-9 Score:   PHQ-9 SCORE 8/10/2017   PHQ-9 Total Score 0     Today's RICARDO-7 Score:   RICARDO-7 SCORE 8/10/2017   Total Score 0       Problem list and histories reviewed & adjusted, as indicated.  Additional history: as documented.    Patient Active Problem List   Diagnosis     Cervical high risk HPV (human papillomavirus) test positive     Past Surgical History:   Procedure Laterality Date      SECTION      x 2     COLONOSCOPY  3/10/17,     hx polyps     COLONOSCOPY N/A 3/10/2017    Procedure: COLONOSCOPY;  Surgeon: Selma Duval MD;  Location:  GI     DILATION AND CURETTAGE, CONIZATION, COMBINED N/A 2017    Procedure: COMBINED DILATION AND CURETTAGE, CONIZATION;;  Surgeon: Dennis Quiroz MD;  Location: [facility]     LASER CO2 CONIZATION N/A 2017    Procedure: LASER CO2 CONIZATION;  DILATION AND CURETTAGE, LASER VAPORIZATION CERVICAL CONIZATION;  Surgeon: Dennis Quiroz MD;  Location: [facility]      Social History     Tobacco Use     Smoking status: Never Smoker     Smokeless tobacco: Never Used   Substance Use Topics     Alcohol use: Yes     Comment: 1/day      Problem (# of Occurrences) Relation  "(Name,Age of Onset)    Breast Cancer (1) Maternal Grandmother    Gastrointestinal Disease (2) Mother: colitis, Daughter: crohns            Current Outpatient Medications   Medication Sig     calcium citrate (CALCITRATE) 950 MG tablet Take 1 tablet by mouth daily.     Cholecalciferol (VITAMIN D-3 PO) Take 1 tablet by mouth daily      Docusate Calcium (STOOL SOFTENER PO) Take 1 capsule by mouth daily     FIBER PO Take 2 tablets by mouth daily     glucosamine-chondroitin 500-400 MG CAPS per capsule Take 2 capsules by mouth daily     Naphazoline HCl (CLEAR EYES OP) Place 1 drop into both eyes daily     vitamin C (ASCORBIC ACID) 1000 MG TABS Take 1,000 mg by mouth daily     zolpidem (AMBIEN) 10 MG tablet Take 2.5 mg by mouth nightly as needed      No current facility-administered medications for this visit.      No Known Allergies    ROS:  12 point review of systems negative other than symptoms noted below or in the HPI.  No urinary frequency or dysuria, bladder or kidney problems      OBJECTIVE:     /88   Ht 1.575 m (5' 2\")   Wt 53.6 kg (118 lb 3.2 oz)   Breastfeeding No   BMI 21.62 kg/m    Body mass index is 21.62 kg/m .    Exam:  Constitutional:  Appearance: Well nourished, well developed alert, in no acute distress  Neurologic:  Mental Status:  Oriented X3.  Normal strength and tone, sensory exam grossly normal, mentation intact and speech normal.    Psychiatric:  Mentation appears normal and affect normal/bright.  Pelvic Exam:  External Genitalia:     Normal appearance for age, no discharge present, no tenderness present, no inflammatory lesions present, color normal  Vagina:     Normal vaginal vault without central or paravaginal defects, ATROPHIC  Bladder:     Nontender to palpation  Urethra:   Urethral Body:  Urethra palpation normal, urethra structural support normal   Urethral Meatus:  No erythema or lesions present  Cervix:     Appearance healthy, no lesions present, nontender to palpation, no " No Vaccines Administered. bleeding present  Uterus:     Nontender to palpation, no masses present, position anteflexed, mobility: normal  Adnexa:     No adnexal tenderness present, no adnexal masses present  Perineum:     Perineum within normal limits, no evidence of trauma, no rashes or skin lesions present  Inguinal Lymph Nodes:     No lymphadenopathy present       In-Clinic Test Results:      ASSESSMENT/PLAN:                                                        ICD-10-CM    1. Cervical high risk HPV (human papillomavirus) test positive  R87.810        65-year-old patient with HPV 18 infection.  Her last Pap was normal.  We had a long discussion of ongoing screening.  If this is normal she can go a year.        Dennis Quiroz MD  [facility] [place]

## 2022-03-02 NOTE — TELEPHONE ENCOUNTER
FYI to provider - Patient is lost to pap tracking follow-up. Attempts to contact pt have been made per reminder process and there has been no reply and/or no appt scheduled.        Routing to Yamile Woodruff in Dr Quiroz's absence

## (undated) DEVICE — SYR 10ML FINGER CONTROL W/O NDL 309695

## (undated) DEVICE — ESU PENCIL W/SMOKE EVAC E2515HS

## (undated) DEVICE — NDL COUNTER 10CT

## (undated) DEVICE — SU CHROMIC 2-0 CT 27" 801H

## (undated) DEVICE — SURGICEL FIBRILLAR HEMOSTAT 1"X2" 1961

## (undated) DEVICE — DECANTER BAG 2002S

## (undated) DEVICE — NDL SPINAL 22GA 3.5" QUINCKE 405181

## (undated) DEVICE — LINEN TOWEL PACK X5 5464

## (undated) DEVICE — SYR 03ML LL W/O NDL 309657

## (undated) DEVICE — PACK TVT HYSTEROSCOPY SMA15HYFSE

## (undated) DEVICE — NDL 19GA 1.5"

## (undated) DEVICE — SUCTION TIP YANKAUER W/O VENT K86

## (undated) DEVICE — BLADE KNIFE SURG 11 371111

## (undated) DEVICE — ESU NDL COLORADO MICRO 3CM STR N103A

## (undated) DEVICE — SOL NACL 0.9% IRRIG 1000ML BOTTLE 07138-09

## (undated) DEVICE — SWAB PROCTO 16" NON-STERILE

## (undated) DEVICE — SOL NACL 0.9% INJ 250ML BAG 2B1322Q

## (undated) DEVICE — GLOVE PROTEXIS MICRO 7.5  2D73PM75

## (undated) DEVICE — GLOVE PROTEXIS W/NEU-THERA 8.0  2D73TE80

## (undated) DEVICE — BLADE KNIFE BEAVER CERVICAL  379100

## (undated) RX ORDER — LIDOCAINE HYDROCHLORIDE 20 MG/ML
INJECTION, SOLUTION EPIDURAL; INFILTRATION; INTRACAUDAL; PERINEURAL
Status: DISPENSED
Start: 2017-11-09

## (undated) RX ORDER — KETOROLAC TROMETHAMINE 30 MG/ML
INJECTION, SOLUTION INTRAMUSCULAR; INTRAVENOUS
Status: DISPENSED
Start: 2017-11-09

## (undated) RX ORDER — FENTANYL CITRATE 50 UG/ML
INJECTION, SOLUTION INTRAMUSCULAR; INTRAVENOUS
Status: DISPENSED
Start: 2017-11-09

## (undated) RX ORDER — FENTANYL CITRATE 50 UG/ML
INJECTION, SOLUTION INTRAMUSCULAR; INTRAVENOUS
Status: DISPENSED
Start: 2017-03-10

## (undated) RX ORDER — ONDANSETRON 2 MG/ML
INJECTION INTRAMUSCULAR; INTRAVENOUS
Status: DISPENSED
Start: 2017-11-09

## (undated) RX ORDER — PROPOFOL 10 MG/ML
INJECTION, EMULSION INTRAVENOUS
Status: DISPENSED
Start: 2017-11-09